# Patient Record
Sex: MALE | Race: WHITE | NOT HISPANIC OR LATINO | Employment: OTHER | URBAN - METROPOLITAN AREA
[De-identification: names, ages, dates, MRNs, and addresses within clinical notes are randomized per-mention and may not be internally consistent; named-entity substitution may affect disease eponyms.]

---

## 2020-04-25 ENCOUNTER — HOSPITAL ENCOUNTER (INPATIENT)
Facility: MEDICAL CENTER | Age: 70
LOS: 7 days | DRG: 190 | End: 2020-05-02
Attending: EMERGENCY MEDICINE | Admitting: HOSPITALIST
Payer: MEDICARE

## 2020-04-25 ENCOUNTER — APPOINTMENT (OUTPATIENT)
Dept: RADIOLOGY | Facility: MEDICAL CENTER | Age: 70
DRG: 190 | End: 2020-04-25
Attending: EMERGENCY MEDICINE
Payer: MEDICARE

## 2020-04-25 DIAGNOSIS — J44.9 CHRONIC OBSTRUCTIVE PULMONARY DISEASE, UNSPECIFIED COPD TYPE (HCC): ICD-10-CM

## 2020-04-25 DIAGNOSIS — J44.1 ACUTE EXACERBATION OF CHRONIC OBSTRUCTIVE PULMONARY DISEASE (COPD) (HCC): ICD-10-CM

## 2020-04-25 DIAGNOSIS — C22.9 MALIGNANT NEOPLASM OF LIVER, UNSPECIFIED LIVER MALIGNANCY TYPE (HCC): ICD-10-CM

## 2020-04-25 DIAGNOSIS — J18.9 PNEUMONIA OF LEFT LUNG DUE TO INFECTIOUS ORGANISM, UNSPECIFIED PART OF LUNG: ICD-10-CM

## 2020-04-25 PROBLEM — R74.01 TRANSAMINITIS: Status: ACTIVE | Noted: 2020-04-25

## 2020-04-25 PROBLEM — R65.10 SIRS (SYSTEMIC INFLAMMATORY RESPONSE SYNDROME) (HCC): Status: ACTIVE | Noted: 2020-04-25

## 2020-04-25 PROBLEM — J96.21 ACUTE AND CHRONIC RESPIRATORY FAILURE WITH HYPOXIA (HCC): Status: ACTIVE | Noted: 2020-04-25

## 2020-04-25 PROBLEM — R79.89 ELEVATED TROPONIN: Status: ACTIVE | Noted: 2020-04-25

## 2020-04-25 PROBLEM — E87.20 LACTIC ACID INCREASED: Status: ACTIVE | Noted: 2020-04-25

## 2020-04-25 LAB
ALBUMIN SERPL BCP-MCNC: 2.9 G/DL (ref 3.2–4.9)
ALBUMIN/GLOB SERPL: 0.9 G/DL
ALP SERPL-CCNC: 186 U/L (ref 30–99)
ALT SERPL-CCNC: 56 U/L (ref 2–50)
ANION GAP SERPL CALC-SCNC: 14 MMOL/L (ref 7–16)
ANISOCYTOSIS BLD QL SMEAR: ABNORMAL
APTT PPP: 35.5 SEC (ref 24.7–36)
AST SERPL-CCNC: 86 U/L (ref 12–45)
BASOPHILS # BLD AUTO: 0.5 % (ref 0–1.8)
BASOPHILS # BLD: 0.03 K/UL (ref 0–0.12)
BILIRUB SERPL-MCNC: 2.3 MG/DL (ref 0.1–1.5)
BUN SERPL-MCNC: 20 MG/DL (ref 8–22)
CALCIUM SERPL-MCNC: 8.2 MG/DL (ref 8.5–10.5)
CHLORIDE SERPL-SCNC: 104 MMOL/L (ref 96–112)
CO2 SERPL-SCNC: 17 MMOL/L (ref 20–33)
COMMENT 1642: NORMAL
COVID ORDER STATUS COVID19: NORMAL
CREAT SERPL-MCNC: 0.97 MG/DL (ref 0.5–1.4)
CRP SERPL HS-MCNC: 2.35 MG/DL (ref 0–0.75)
EKG IMPRESSION: NORMAL
EOSINOPHIL # BLD AUTO: 0.05 K/UL (ref 0–0.51)
EOSINOPHIL NFR BLD: 0.8 % (ref 0–6.9)
ERYTHROCYTE [DISTWIDTH] IN BLOOD BY AUTOMATED COUNT: 71.5 FL (ref 35.9–50)
FERRITIN SERPL-MCNC: 298 NG/ML (ref 22–322)
GLOBULIN SER CALC-MCNC: 3.1 G/DL (ref 1.9–3.5)
GLUCOSE SERPL-MCNC: 149 MG/DL (ref 65–99)
HCT VFR BLD AUTO: 40.3 % (ref 42–52)
HGB BLD-MCNC: 13.7 G/DL (ref 14–18)
IMM GRANULOCYTES # BLD AUTO: 0.03 K/UL (ref 0–0.11)
IMM GRANULOCYTES NFR BLD AUTO: 0.5 % (ref 0–0.9)
INR PPP: 1.41 (ref 0.87–1.13)
LACTATE BLD-SCNC: 4 MMOL/L (ref 0.5–2)
LYMPHOCYTES # BLD AUTO: 0.84 K/UL (ref 1–4.8)
LYMPHOCYTES NFR BLD: 13.5 % (ref 22–41)
MACROCYTES BLD QL SMEAR: ABNORMAL
MCH RBC QN AUTO: 37.8 PG (ref 27–33)
MCHC RBC AUTO-ENTMCNC: 34 G/DL (ref 33.7–35.3)
MCV RBC AUTO: 111.3 FL (ref 81.4–97.8)
MONOCYTES # BLD AUTO: 0.46 K/UL (ref 0–0.85)
MONOCYTES NFR BLD AUTO: 7.4 % (ref 0–13.4)
MORPHOLOGY BLD-IMP: NORMAL
NEUTROPHILS # BLD AUTO: 4.83 K/UL (ref 1.82–7.42)
NEUTROPHILS NFR BLD: 77.3 % (ref 44–72)
NRBC # BLD AUTO: 0 K/UL
NRBC BLD-RTO: 0 /100 WBC
PLATELET # BLD AUTO: 55 K/UL (ref 164–446)
PLATELET BLD QL SMEAR: NORMAL
PMV BLD AUTO: 11 FL (ref 9–12.9)
POTASSIUM SERPL-SCNC: 4.2 MMOL/L (ref 3.6–5.5)
PROCALCITONIN SERPL-MCNC: 0.22 NG/ML
PROT SERPL-MCNC: 6 G/DL (ref 6–8.2)
PROTHROMBIN TIME: 17.6 SEC (ref 12–14.6)
RBC # BLD AUTO: 3.62 M/UL (ref 4.7–6.1)
RBC BLD AUTO: PRESENT
SARS-COV-2 RNA RESP QL NAA+PROBE: NEGATIVE
SODIUM SERPL-SCNC: 135 MMOL/L (ref 135–145)
SPECIMEN SOURCE: NORMAL
TROPONIN T SERPL-MCNC: 35 NG/L (ref 6–19)
WBC # BLD AUTO: 6.2 K/UL (ref 4.8–10.8)

## 2020-04-25 PROCEDURE — 85730 THROMBOPLASTIN TIME PARTIAL: CPT

## 2020-04-25 PROCEDURE — G2023 SPECIMEN COLLECT COVID-19: HCPCS | Performed by: EMERGENCY MEDICINE

## 2020-04-25 PROCEDURE — 87522 HEPATITIS C REVRS TRNSCRPJ: CPT

## 2020-04-25 PROCEDURE — 71045 X-RAY EXAM CHEST 1 VIEW: CPT

## 2020-04-25 PROCEDURE — 94640 AIRWAY INHALATION TREATMENT: CPT

## 2020-04-25 PROCEDURE — 99285 EMERGENCY DEPT VISIT HI MDM: CPT

## 2020-04-25 PROCEDURE — 700111 HCHG RX REV CODE 636 W/ 250 OVERRIDE (IP): Performed by: HOSPITALIST

## 2020-04-25 PROCEDURE — 93005 ELECTROCARDIOGRAM TRACING: CPT

## 2020-04-25 PROCEDURE — 87040 BLOOD CULTURE FOR BACTERIA: CPT

## 2020-04-25 PROCEDURE — U0004 COV-19 TEST NON-CDC HGH THRU: HCPCS

## 2020-04-25 PROCEDURE — 85610 PROTHROMBIN TIME: CPT

## 2020-04-25 PROCEDURE — 80074 ACUTE HEPATITIS PANEL: CPT

## 2020-04-25 PROCEDURE — 80053 COMPREHEN METABOLIC PANEL: CPT

## 2020-04-25 PROCEDURE — 700102 HCHG RX REV CODE 250 W/ 637 OVERRIDE(OP): Performed by: HOSPITALIST

## 2020-04-25 PROCEDURE — 770020 HCHG ROOM/CARE - TELE (206)

## 2020-04-25 PROCEDURE — 86140 C-REACTIVE PROTEIN: CPT

## 2020-04-25 PROCEDURE — 700101 HCHG RX REV CODE 250: Performed by: HOSPITALIST

## 2020-04-25 PROCEDURE — 700105 HCHG RX REV CODE 258: Performed by: HOSPITALIST

## 2020-04-25 PROCEDURE — 85025 COMPLETE CBC W/AUTO DIFF WBC: CPT

## 2020-04-25 PROCEDURE — 83605 ASSAY OF LACTIC ACID: CPT

## 2020-04-25 PROCEDURE — 96365 THER/PROPH/DIAG IV INF INIT: CPT

## 2020-04-25 PROCEDURE — 99223 1ST HOSP IP/OBS HIGH 75: CPT | Performed by: HOSPITALIST

## 2020-04-25 PROCEDURE — 84145 PROCALCITONIN (PCT): CPT

## 2020-04-25 PROCEDURE — 700102 HCHG RX REV CODE 250 W/ 637 OVERRIDE(OP): Performed by: EMERGENCY MEDICINE

## 2020-04-25 PROCEDURE — 700105 HCHG RX REV CODE 258: Performed by: EMERGENCY MEDICINE

## 2020-04-25 PROCEDURE — 96375 TX/PRO/DX INJ NEW DRUG ADDON: CPT

## 2020-04-25 PROCEDURE — 700111 HCHG RX REV CODE 636 W/ 250 OVERRIDE (IP): Performed by: EMERGENCY MEDICINE

## 2020-04-25 PROCEDURE — 36415 COLL VENOUS BLD VENIPUNCTURE: CPT

## 2020-04-25 PROCEDURE — A9270 NON-COVERED ITEM OR SERVICE: HCPCS | Performed by: HOSPITALIST

## 2020-04-25 PROCEDURE — 82728 ASSAY OF FERRITIN: CPT

## 2020-04-25 PROCEDURE — A9270 NON-COVERED ITEM OR SERVICE: HCPCS | Performed by: EMERGENCY MEDICINE

## 2020-04-25 PROCEDURE — 84484 ASSAY OF TROPONIN QUANT: CPT

## 2020-04-25 RX ORDER — TRAZODONE HYDROCHLORIDE 50 MG/1
50 TABLET ORAL NIGHTLY
COMMUNITY

## 2020-04-25 RX ORDER — AMOXICILLIN 250 MG
2 CAPSULE ORAL 2 TIMES DAILY
Status: DISCONTINUED | OUTPATIENT
Start: 2020-04-25 | End: 2020-05-02 | Stop reason: HOSPADM

## 2020-04-25 RX ORDER — SILDENAFIL 25 MG/1
60 TABLET, FILM COATED ORAL 3 TIMES DAILY
COMMUNITY

## 2020-04-25 RX ORDER — ALBUTEROL SULFATE 90 UG/1
2 AEROSOL, METERED RESPIRATORY (INHALATION) ONCE
Status: COMPLETED | OUTPATIENT
Start: 2020-04-25 | End: 2020-04-25

## 2020-04-25 RX ORDER — DOXYCYCLINE 100 MG/1
100 TABLET ORAL EVERY 12 HOURS
Status: COMPLETED | OUTPATIENT
Start: 2020-04-26 | End: 2020-04-30

## 2020-04-25 RX ORDER — GUAIFENESIN/DEXTROMETHORPHAN 100-10MG/5
10 SYRUP ORAL EVERY 6 HOURS PRN
Status: DISCONTINUED | OUTPATIENT
Start: 2020-04-25 | End: 2020-05-02 | Stop reason: HOSPADM

## 2020-04-25 RX ORDER — MULTIVIT WITH MINERALS/LUTEIN
1 TABLET ORAL DAILY
COMMUNITY

## 2020-04-25 RX ORDER — SODIUM CHLORIDE, SODIUM LACTATE, POTASSIUM CHLORIDE, CALCIUM CHLORIDE 600; 310; 30; 20 MG/100ML; MG/100ML; MG/100ML; MG/100ML
1000 INJECTION, SOLUTION INTRAVENOUS ONCE
Status: COMPLETED | OUTPATIENT
Start: 2020-04-25 | End: 2020-04-25

## 2020-04-25 RX ORDER — AZITHROMYCIN 500 MG/1
500 INJECTION, POWDER, LYOPHILIZED, FOR SOLUTION INTRAVENOUS ONCE
Status: COMPLETED | OUTPATIENT
Start: 2020-04-25 | End: 2020-04-25

## 2020-04-25 RX ORDER — BISACODYL 10 MG
10 SUPPOSITORY, RECTAL RECTAL
Status: DISCONTINUED | OUTPATIENT
Start: 2020-04-25 | End: 2020-05-02 | Stop reason: HOSPADM

## 2020-04-25 RX ORDER — ACETAMINOPHEN 325 MG/1
650 TABLET ORAL EVERY 6 HOURS PRN
Status: DISCONTINUED | OUTPATIENT
Start: 2020-04-25 | End: 2020-05-02 | Stop reason: HOSPADM

## 2020-04-25 RX ORDER — ECHINACEA PURPUREA EXTRACT 125 MG
2 TABLET ORAL 2 TIMES DAILY PRN
COMMUNITY

## 2020-04-25 RX ORDER — AZITHROMYCIN 250 MG/1
500 TABLET, FILM COATED ORAL DAILY
Status: DISCONTINUED | OUTPATIENT
Start: 2020-04-25 | End: 2020-04-25

## 2020-04-25 RX ORDER — METHYLPREDNISOLONE SODIUM SUCCINATE 125 MG/2ML
125 INJECTION, POWDER, LYOPHILIZED, FOR SOLUTION INTRAMUSCULAR; INTRAVENOUS ONCE
Status: COMPLETED | OUTPATIENT
Start: 2020-04-25 | End: 2020-04-25

## 2020-04-25 RX ORDER — IPRATROPIUM BROMIDE AND ALBUTEROL SULFATE 2.5; .5 MG/3ML; MG/3ML
3 SOLUTION RESPIRATORY (INHALATION)
Status: DISCONTINUED | OUTPATIENT
Start: 2020-04-25 | End: 2020-04-26 | Stop reason: ALTCHOICE

## 2020-04-25 RX ORDER — MACITENTAN 10 MG/1
1 TABLET, FILM COATED ORAL DAILY
COMMUNITY

## 2020-04-25 RX ORDER — TERAZOSIN 2 MG/1
2 CAPSULE ORAL DAILY
COMMUNITY

## 2020-04-25 RX ORDER — TRAMADOL HYDROCHLORIDE 50 MG/1
50 TABLET ORAL EVERY 6 HOURS PRN
Status: DISCONTINUED | OUTPATIENT
Start: 2020-04-25 | End: 2020-04-26

## 2020-04-25 RX ORDER — POLYETHYLENE GLYCOL 3350 17 G/17G
1 POWDER, FOR SOLUTION ORAL
Status: DISCONTINUED | OUTPATIENT
Start: 2020-04-25 | End: 2020-05-02 | Stop reason: HOSPADM

## 2020-04-25 RX ADMIN — IPRATROPIUM BROMIDE AND ALBUTEROL SULFATE 3 ML: .5; 3 SOLUTION RESPIRATORY (INHALATION) at 23:06

## 2020-04-25 RX ADMIN — AMPICILLIN SODIUM AND SULBACTAM SODIUM 3 G: 2; 1 INJECTION, POWDER, FOR SOLUTION INTRAMUSCULAR; INTRAVENOUS at 21:55

## 2020-04-25 RX ADMIN — AZITHROMYCIN DIHYDRATE 500 MG: 500 INJECTION, POWDER, LYOPHILIZED, FOR SOLUTION INTRAVENOUS at 21:54

## 2020-04-25 RX ADMIN — TRAMADOL HYDROCHLORIDE 50 MG: 50 TABLET, FILM COATED ORAL at 23:58

## 2020-04-25 RX ADMIN — METHYLPREDNISOLONE SODIUM SUCCINATE 125 MG: 125 INJECTION, POWDER, FOR SOLUTION INTRAMUSCULAR; INTRAVENOUS at 20:56

## 2020-04-25 RX ADMIN — GUAIFENESIN AND DEXTROMETHORPHAN 10 ML: 100; 10 SYRUP ORAL at 23:58

## 2020-04-25 RX ADMIN — SODIUM CHLORIDE, POTASSIUM CHLORIDE, SODIUM LACTATE AND CALCIUM CHLORIDE 1000 ML: 600; 310; 30; 20 INJECTION, SOLUTION INTRAVENOUS at 22:35

## 2020-04-25 RX ADMIN — SODIUM CHLORIDE, POTASSIUM CHLORIDE, SODIUM LACTATE AND CALCIUM CHLORIDE 1000 ML: 600; 310; 30; 20 INJECTION, SOLUTION INTRAVENOUS at 21:06

## 2020-04-25 RX ADMIN — ALBUTEROL SULFATE 2 PUFF: 90 AEROSOL, METERED RESPIRATORY (INHALATION) at 21:02

## 2020-04-25 ASSESSMENT — ENCOUNTER SYMPTOMS
PALPITATIONS: 0
CHILLS: 0
PHOTOPHOBIA: 0
BACK PAIN: 1
MYALGIAS: 1
COUGH: 1
FEVER: 1
DIARRHEA: 0
FOCAL WEAKNESS: 0
DEPRESSION: 0
VOMITING: 1
NAUSEA: 1
ABDOMINAL PAIN: 0
WHEEZING: 1
HEADACHES: 0
DIZZINESS: 0
SORE THROAT: 0
TINGLING: 0
SHORTNESS OF BREATH: 1

## 2020-04-25 ASSESSMENT — PATIENT HEALTH QUESTIONNAIRE - PHQ9
1. LITTLE INTEREST OR PLEASURE IN DOING THINGS: NOT AT ALL
2. FEELING DOWN, DEPRESSED, IRRITABLE, OR HOPELESS: NOT AT ALL
SUM OF ALL RESPONSES TO PHQ9 QUESTIONS 1 AND 2: 0

## 2020-04-25 ASSESSMENT — FIBROSIS 4 INDEX: FIB4 SCORE: 14

## 2020-04-25 ASSESSMENT — PAIN SCALES - WONG BAKER: WONGBAKER_NUMERICALRESPONSE: HURTS EVEN MORE

## 2020-04-26 PROBLEM — C22.9 LIVER CANCER (HCC): Status: ACTIVE | Noted: 2020-04-26

## 2020-04-26 PROBLEM — J44.9 COPD (CHRONIC OBSTRUCTIVE PULMONARY DISEASE) (HCC): Status: ACTIVE | Noted: 2020-04-26

## 2020-04-26 PROBLEM — D69.6 THROMBOCYTOPENIA (HCC): Status: ACTIVE | Noted: 2020-04-26

## 2020-04-26 PROBLEM — I27.20 PULMONARY HYPERTENSION (HCC): Status: ACTIVE | Noted: 2020-04-26

## 2020-04-26 LAB
ALBUMIN SERPL BCP-MCNC: 2.6 G/DL (ref 3.2–4.9)
ALBUMIN/GLOB SERPL: 0.9 G/DL
ALP SERPL-CCNC: 164 U/L (ref 30–99)
ALT SERPL-CCNC: 49 U/L (ref 2–50)
ANION GAP SERPL CALC-SCNC: 15 MMOL/L (ref 7–16)
AST SERPL-CCNC: 78 U/L (ref 12–45)
BILIRUB SERPL-MCNC: 2.3 MG/DL (ref 0.1–1.5)
BUN SERPL-MCNC: 18 MG/DL (ref 8–22)
CALCIUM SERPL-MCNC: 7.8 MG/DL (ref 8.5–10.5)
CHLORIDE SERPL-SCNC: 106 MMOL/L (ref 96–112)
CO2 SERPL-SCNC: 16 MMOL/L (ref 20–33)
CREAT SERPL-MCNC: 0.77 MG/DL (ref 0.5–1.4)
ERYTHROCYTE [DISTWIDTH] IN BLOOD BY AUTOMATED COUNT: 69.7 FL (ref 35.9–50)
GLOBULIN SER CALC-MCNC: 2.8 G/DL (ref 1.9–3.5)
GLUCOSE SERPL-MCNC: 163 MG/DL (ref 65–99)
HAV IGM SERPL QL IA: ABNORMAL
HBV CORE IGM SER QL: ABNORMAL
HBV SURFACE AG SER QL: ABNORMAL
HCT VFR BLD AUTO: 35.8 % (ref 42–52)
HCV AB SER QL: REACTIVE
HGB BLD-MCNC: 12.5 G/DL (ref 14–18)
LACTATE BLD-SCNC: 1 MMOL/L (ref 0.5–2)
LACTATE BLD-SCNC: 3 MMOL/L (ref 0.5–2)
LACTATE BLD-SCNC: 3.1 MMOL/L (ref 0.5–2)
LACTATE BLD-SCNC: 4.8 MMOL/L (ref 0.5–2)
MCH RBC QN AUTO: 38.2 PG (ref 27–33)
MCHC RBC AUTO-ENTMCNC: 34.9 G/DL (ref 33.7–35.3)
MCV RBC AUTO: 109.5 FL (ref 81.4–97.8)
MORPHOLOGY BLD-IMP: NORMAL
PLATELET # BLD AUTO: 47 K/UL (ref 164–446)
PLATELET BLD QL SMEAR: NORMAL
PLATELETS.RETICULATED NFR BLD AUTO: 3.9 K/UL (ref 0.6–13.1)
PMV BLD AUTO: 11.7 FL (ref 9–12.9)
POTASSIUM SERPL-SCNC: 3.7 MMOL/L (ref 3.6–5.5)
PROT SERPL-MCNC: 5.4 G/DL (ref 6–8.2)
RBC # BLD AUTO: 3.27 M/UL (ref 4.7–6.1)
SODIUM SERPL-SCNC: 137 MMOL/L (ref 135–145)
TROPONIN T SERPL-MCNC: 26 NG/L (ref 6–19)
TROPONIN T SERPL-MCNC: 26 NG/L (ref 6–19)
WBC # BLD AUTO: 4.5 K/UL (ref 4.8–10.8)

## 2020-04-26 PROCEDURE — 700105 HCHG RX REV CODE 258: Performed by: HOSPITALIST

## 2020-04-26 PROCEDURE — 84484 ASSAY OF TROPONIN QUANT: CPT

## 2020-04-26 PROCEDURE — 94640 AIRWAY INHALATION TREATMENT: CPT

## 2020-04-26 PROCEDURE — 770020 HCHG ROOM/CARE - TELE (206)

## 2020-04-26 PROCEDURE — 700111 HCHG RX REV CODE 636 W/ 250 OVERRIDE (IP): Performed by: HOSPITALIST

## 2020-04-26 PROCEDURE — 85055 RETICULATED PLATELET ASSAY: CPT

## 2020-04-26 PROCEDURE — 94669 MECHANICAL CHEST WALL OSCILL: CPT

## 2020-04-26 PROCEDURE — 85027 COMPLETE CBC AUTOMATED: CPT

## 2020-04-26 PROCEDURE — 94760 N-INVAS EAR/PLS OXIMETRY 1: CPT

## 2020-04-26 PROCEDURE — 99232 SBSQ HOSP IP/OBS MODERATE 35: CPT | Performed by: HOSPITALIST

## 2020-04-26 PROCEDURE — A9270 NON-COVERED ITEM OR SERVICE: HCPCS | Performed by: HOSPITALIST

## 2020-04-26 PROCEDURE — 700102 HCHG RX REV CODE 250 W/ 637 OVERRIDE(OP): Performed by: HOSPITALIST

## 2020-04-26 PROCEDURE — 80053 COMPREHEN METABOLIC PANEL: CPT

## 2020-04-26 PROCEDURE — 83605 ASSAY OF LACTIC ACID: CPT | Mod: 91

## 2020-04-26 PROCEDURE — 700101 HCHG RX REV CODE 250: Performed by: HOSPITALIST

## 2020-04-26 RX ORDER — OXYCODONE HYDROCHLORIDE 5 MG/1
5 TABLET ORAL EVERY 4 HOURS PRN
Status: DISCONTINUED | OUTPATIENT
Start: 2020-04-26 | End: 2020-05-02 | Stop reason: HOSPADM

## 2020-04-26 RX ORDER — ALBUTEROL SULFATE 90 UG/1
2 AEROSOL, METERED RESPIRATORY (INHALATION)
Status: DISCONTINUED | OUTPATIENT
Start: 2020-04-26 | End: 2020-04-29

## 2020-04-26 RX ORDER — SODIUM CHLORIDE 9 MG/ML
INJECTION, SOLUTION INTRAVENOUS CONTINUOUS
Status: DISCONTINUED | OUTPATIENT
Start: 2020-04-26 | End: 2020-04-27

## 2020-04-26 RX ORDER — TRAZODONE HYDROCHLORIDE 50 MG/1
50 TABLET ORAL NIGHTLY
Status: DISCONTINUED | OUTPATIENT
Start: 2020-04-26 | End: 2020-05-02 | Stop reason: HOSPADM

## 2020-04-26 RX ORDER — OXYCODONE HYDROCHLORIDE 5 MG/1
2.5 TABLET ORAL EVERY 8 HOURS PRN
Qty: 8 TAB | Refills: 0 | Status: SHIPPED | OUTPATIENT
Start: 2020-04-26 | End: 2020-05-01

## 2020-04-26 RX ADMIN — ALBUTEROL SULFATE 2 PUFF: 90 AEROSOL, METERED RESPIRATORY (INHALATION) at 08:00

## 2020-04-26 RX ADMIN — GUAIFENESIN AND DEXTROMETHORPHAN 10 ML: 100; 10 SYRUP ORAL at 20:15

## 2020-04-26 RX ADMIN — ALBUTEROL SULFATE 2 PUFF: 90 AEROSOL, METERED RESPIRATORY (INHALATION) at 15:00

## 2020-04-26 RX ADMIN — AMPICILLIN SODIUM AND SULBACTAM SODIUM 3 G: 2; 1 INJECTION, POWDER, FOR SOLUTION INTRAMUSCULAR; INTRAVENOUS at 13:15

## 2020-04-26 RX ADMIN — OXYCODONE HYDROCHLORIDE 5 MG: 5 TABLET ORAL at 21:48

## 2020-04-26 RX ADMIN — DOXYCYCLINE 100 MG: 100 TABLET, FILM COATED ORAL at 17:36

## 2020-04-26 RX ADMIN — OXYCODONE HYDROCHLORIDE 5 MG: 5 TABLET ORAL at 10:17

## 2020-04-26 RX ADMIN — ALBUTEROL SULFATE 2 PUFF: 90 AEROSOL, METERED RESPIRATORY (INHALATION) at 11:00

## 2020-04-26 RX ADMIN — ACETAMINOPHEN 650 MG: 325 TABLET, FILM COATED ORAL at 20:15

## 2020-04-26 RX ADMIN — DOXYCYCLINE 100 MG: 100 TABLET, FILM COATED ORAL at 05:40

## 2020-04-26 RX ADMIN — ASPIRIN 81 MG: 81 TABLET, COATED ORAL at 17:36

## 2020-04-26 RX ADMIN — TRAZODONE HYDROCHLORIDE 50 MG: 50 TABLET ORAL at 21:48

## 2020-04-26 RX ADMIN — OXYCODONE HYDROCHLORIDE 5 MG: 5 TABLET ORAL at 17:36

## 2020-04-26 RX ADMIN — AMPICILLIN SODIUM AND SULBACTAM SODIUM 3 G: 2; 1 INJECTION, POWDER, FOR SOLUTION INTRAMUSCULAR; INTRAVENOUS at 05:40

## 2020-04-26 RX ADMIN — IPRATROPIUM BROMIDE AND ALBUTEROL SULFATE 3 ML: .5; 3 SOLUTION RESPIRATORY (INHALATION) at 03:30

## 2020-04-26 RX ADMIN — GUAIFENESIN AND DEXTROMETHORPHAN 10 ML: 100; 10 SYRUP ORAL at 05:40

## 2020-04-26 RX ADMIN — AMPICILLIN SODIUM AND SULBACTAM SODIUM 3 G: 2; 1 INJECTION, POWDER, FOR SOLUTION INTRAMUSCULAR; INTRAVENOUS at 23:59

## 2020-04-26 RX ADMIN — TRAMADOL HYDROCHLORIDE 50 MG: 50 TABLET, FILM COATED ORAL at 05:40

## 2020-04-26 RX ADMIN — SODIUM CHLORIDE: 9 INJECTION, SOLUTION INTRAVENOUS at 10:34

## 2020-04-26 RX ADMIN — ALBUTEROL SULFATE 2 PUFF: 90 AEROSOL, METERED RESPIRATORY (INHALATION) at 20:17

## 2020-04-26 RX ADMIN — AMPICILLIN SODIUM AND SULBACTAM SODIUM 3 G: 2; 1 INJECTION, POWDER, FOR SOLUTION INTRAMUSCULAR; INTRAVENOUS at 17:49

## 2020-04-26 ASSESSMENT — PAIN SCALES - WONG BAKER
WONGBAKER_NUMERICALRESPONSE: DOESN'T HURT AT ALL
WONGBAKER_NUMERICALRESPONSE: HURTS A LITTLE MORE
WONGBAKER_NUMERICALRESPONSE: HURTS EVEN MORE
WONGBAKER_NUMERICALRESPONSE: HURTS A WHOLE LOT

## 2020-04-26 ASSESSMENT — LIFESTYLE VARIABLES
HAVE PEOPLE ANNOYED YOU BY CRITICIZING YOUR DRINKING: NO
TOTAL SCORE: 0
AVERAGE NUMBER OF DAYS PER WEEK YOU HAVE A DRINK CONTAINING ALCOHOL: 0
ON A TYPICAL DAY WHEN YOU DRINK ALCOHOL HOW MANY DRINKS DO YOU HAVE: 0
TOTAL SCORE: 0
EVER_SMOKED: NEVER
DOES PATIENT WANT TO STOP DRINKING: NO
HOW MANY TIMES IN THE PAST YEAR HAVE YOU HAD 5 OR MORE DRINKS IN A DAY: 0
ALCOHOL_USE: NO
TOTAL SCORE: 0
CONSUMPTION TOTAL: NEGATIVE
HAVE YOU EVER FELT YOU SHOULD CUT DOWN ON YOUR DRINKING: NO
EVER HAD A DRINK FIRST THING IN THE MORNING TO STEADY YOUR NERVES TO GET RID OF A HANGOVER: NO
EVER FELT BAD OR GUILTY ABOUT YOUR DRINKING: NO

## 2020-04-26 ASSESSMENT — ENCOUNTER SYMPTOMS
DIZZINESS: 0
FALLS: 0
PSYCHIATRIC NEGATIVE: 1
LOSS OF CONSCIOUSNESS: 0
ABDOMINAL PAIN: 0
SPEECH CHANGE: 0
FEVER: 1
HEADACHES: 0
SHORTNESS OF BREATH: 1
SPUTUM PRODUCTION: 0
NAUSEA: 1
MYALGIAS: 1
PALPITATIONS: 0
VOMITING: 0
CHILLS: 0
WEAKNESS: 1
COUGH: 1

## 2020-04-26 ASSESSMENT — PATIENT HEALTH QUESTIONNAIRE - PHQ9
1. LITTLE INTEREST OR PLEASURE IN DOING THINGS: NOT AT ALL
SUM OF ALL RESPONSES TO PHQ9 QUESTIONS 1 AND 2: 0
2. FEELING DOWN, DEPRESSED, IRRITABLE, OR HOPELESS: NOT AT ALL

## 2020-04-26 NOTE — PROGRESS NOTES
[facility] Medicine Daily Progress Note    Date of Service  4/26/2020    Chief Complaint  69 y.o. male admitted 4/25/2020 with flu like symptoms and SOB.     Interval Problem Update  Asks about his home medications. Eager to get home but still requiring 5L NC. Has been self isolating at home and has no travel or sick contacts.     Consultants/Specialty  None    Code Status  Full    Disposition  Anticipate home tomorrow.    Review of Systems  Review of Systems   Constitutional: Positive for fever and malaise/fatigue. Negative for chills.   Respiratory: Positive for cough and shortness of breath. Negative for sputum production.    Cardiovascular: Negative for chest pain, palpitations and leg swelling.   Gastrointestinal: Positive for nausea. Negative for abdominal pain and vomiting.   Genitourinary: Negative for dysuria.   Musculoskeletal: Positive for myalgias. Negative for falls.   Neurological: Positive for weakness. Negative for dizziness, speech change, loss of consciousness and headaches.   Psychiatric/Behavioral: Negative.    All other systems reviewed and are negative.       Physical Exam  Temp:  [37.1 °C (98.8 °F)-37.3 °C (99.2 °F)] 37.3 °C (99.2 °F)  Pulse:  [78-95] 82  Resp:  [16-28] 19  BP: ()/(55-87) 106/66  SpO2:  [92 %-95 %] 94 %    Physical Exam  Vitals signs reviewed.   Constitutional:       General: He is not in acute distress.     Appearance: He is not diaphoretic.   HENT:      Head: Normocephalic.      Mouth/Throat:      Mouth: Mucous membranes are dry.   Eyes:      General:         Right eye: No discharge.         Left eye: No discharge.      Extraocular Movements: Extraocular movements intact.   Neck:      Musculoskeletal: Normal range of motion. No muscular tenderness.   Cardiovascular:      Rate and Rhythm: Normal rate and regular rhythm.      Pulses: Normal pulses.   Pulmonary:      Effort: Pulmonary effort is normal. No respiratory distress.      Breath sounds: No wheezing or rales.    Abdominal:      Palpations: Abdomen is soft.      Tenderness: There is no abdominal tenderness. There is no guarding.   Musculoskeletal:         General: No tenderness or deformity.   Skin:     General: Skin is dry.   Neurological:      General: No focal deficit present.      Mental Status: He is alert and oriented to person, place, and time.   Psychiatric:         Mood and Affect: Mood normal.         Speech: Speech normal.         Behavior: Behavior normal.         Fluids    Intake/Output Summary (Last 24 hours) at 4/26/2020 0727  Last data filed at 4/26/2020 0433  Gross per 24 hour   Intake --   Output 500 ml   Net -500 ml       Laboratory  Recent Labs     04/25/20 2040 04/26/20  0010   WBC 6.2 4.5*   RBC 3.62* 3.27*   HEMOGLOBIN 13.7* 12.5*   HEMATOCRIT 40.3* 35.8*   .3* 109.5*   MCH 37.8* 38.2*   MCHC 34.0 34.9   RDW 71.5* 69.7*   PLATELETCT 55* 47*   MPV 11.0 11.7     Recent Labs     04/25/20 2040 04/26/20  0010   SODIUM 135 137   POTASSIUM 4.2 3.7   CHLORIDE 104 106   CO2 17* 16*   GLUCOSE 149* 163*   BUN 20 18   CREATININE 0.97 0.77   CALCIUM 8.2* 7.8*     Recent Labs     04/25/20 2040   APTT 35.5   INR 1.41*               Imaging  DX-CHEST-PORTABLE (1 VIEW)   Final Result      1.  Central vascular and mild interstitial prominence which could represent vascular congestion. Cannot exclude interstitial pneumonitis.   2.  Possible retrocardiac left lower lobe opacity which may represent atelectasis and/or pneumonitis.   3.  CT chest may be performed for further evaluation.           Assessment/Plan  * Acute and chronic respiratory failure with hypoxia (HCC)- (present on admission)  Assessment & Plan  Secondary to COPD exacerbation, CAP? COVID negative. Chest x-ray shows possible pneumonitis versus pneumonia.  - weaned from 15L face mask to 5L  - s/p 2L IVF in the ED  - RT protocol  - albuterol PRN  - continue with doxy and unasyn    COPD (chronic obstructive pulmonary disease) (HCC)- (present on  admission)  Assessment & Plan  Denies wheezing or recent exacerbations.   - RT protocol  - albuterol PRN    Thrombocytopenia (HCC)- (present on admission)  Assessment & Plan  History of liver cancer, unknown baseline  -  Hold anticoagulation.    Elevated troponin  Assessment & Plan  Resolved. No chest pain, likely ischemic demand in the setting of hypoxia.   - continue tele  - monitor clinically    Transaminitis- (present on admission)  Assessment & Plan  Mildly elevated on admission and improved today. In the setting of liver cancer.  - continue home oral chemo    Lactic acid increased- (present on admission)  Assessment & Plan  Secondary to dehydration, suspected pneumonia, and hypoxia.   - covid negative  - continue abx  - holding diuretics and start gentle fluids    CAP (community acquired pneumonia)- (present on admission)  Assessment & Plan  Treatment as noted above.  - procal pending    SIRS (systemic inflammatory response syndrome) (HCC)- (present on admission)  Assessment & Plan  SIRS criteria identified on my evaluation include:  Tachypnea, with respirations greater than 20 per minute    Patient is tachypneic and has lactic acidosis, otherwise no leukocytosis, he is afebrile and blood pressure stable.  Treatment as noted above.       VTE prophylaxis: SCDs

## 2020-04-26 NOTE — ED TRIAGE NOTES
Walk in with family for fever, cough SOB, body aches, fever, chills, nausea, and vomiting x3 days.  Cough became productive 4-5 days ago. Currently complains of left rib pain 5/10 at rest, worsening with coughing. Alert and orientated,  SOB with rest, oxygen saturation 75% at time of walk in.  Immediately roomed and placed on non-rebreather with currently oxygen saturation at 93. IV placed and labs drawn. EKG done at bedside. Pt has hx of liver cancer.

## 2020-04-26 NOTE — ED NOTES
Tech from Lab called with critical result of lactic 4.0 at 2055. Critical lab result read back to tech.   Dr. Mcneil notified of critical lab result at 2055.  Critical lab result read back by Dr. Mcneil.

## 2020-04-26 NOTE — ASSESSMENT & PLAN NOTE
History of liver cancer, unknown baseline. Stable ~50. No evidence of active bleeding  Continue to monitor.  Hold anticoagulation.

## 2020-04-26 NOTE — PROGRESS NOTES
Transported  from green pod, aox4, sr on monitor, unsteady on his feet.  Call light within reach. Needs attended. Plan of care discussed and understood.

## 2020-04-26 NOTE — ASSESSMENT & PLAN NOTE
Secondary to dehydration, suspected pneumonia, and hypoxia.   - covid negative  - continue abx  - holding diuretics and start gentle fluids

## 2020-04-26 NOTE — CARE PLAN
Problem: Infection  Goal: Will remain free from infection  Outcome: PROGRESSING AS EXPECTED  Intervention: Implement standard precautions and perform hand washing before and after patient contact  Note: Practice  aseptic techniques.     Problem: Respiratory:  Goal: Respiratory status will improve  Outcome: PROGRESSING AS EXPECTED  Intervention: Administer and titrate oxygen therapy  Note: RT protocol in place.

## 2020-04-26 NOTE — CARE PLAN
Pt instructed on use of incentive spirometer and pt demonstrate use back correctly. Informed pt goal is to get him to 5L or less of oxygen in order for him to discharge. Pt shows understanding. Will continue to monitor.

## 2020-04-26 NOTE — PROGRESS NOTES
With complaints of sharp  left rib pain 4/10, enma when coughing. Medicated as scheduled. Call light within reach. To continue to monitor.

## 2020-04-26 NOTE — ED PROVIDER NOTES
ED Provider Note    Scribed for Dr. Jerome Mcneil M.D. by Jerome Jasmine. 4/25/2020  8:37 PM    Primary care provider: No primary care provider noted.  Means of arrival: walk-in  History obtained from: patient  History limited by: none    CHIEF COMPLAINT  Chief Complaint   Patient presents with   • Shortness of Breath     fever, cough, body aches, nausea, vomiting. low saturation at check in 75%       HPI  Virgilio Loza is a 69 y.o. male with a history of COPD who presents to the Emergency Department with complaints of a cough for the past month. He recently developed increased shortness of breath and fever last night. He further reports associated left back pain. No alleviating factors noted. Patient's family found it difficult to wake him up which why he was brought in. The patient was hypoxic in triage in the 70s. He is on nocturnal oxygen.     PPE Note: I personally donned full PPE for all patient encounters during this visit, including being clean-shaven with an N95 respirator mask, gloves, and goggles.     Scribe remained outside the patient's room and did not have any contact with the patient for the duration of patient encounter.      REVIEW OF SYSTEMS  Pertinent positives include cough, shortness of breath, fever, left back pain. As above, all other systems reviewed and are negative.   See HPI for further details.     PAST MEDICAL HISTORY  COPD    SURGICAL HISTORY  patient denies any surgical history    SOCIAL HISTORY   Unable to obtain due to acuity of condition.    FAMILY HISTORY  None noted when reviewed.     CURRENT MEDICATIONS  None noted when reviewed.      ALLERGIES  Not on File    PHYSICAL EXAM  VITAL SIGNS: BP (!) 162/76   Pulse 95   Resp (!) 28   Ht 1.829 m (6')   Wt 72.6 kg (160 lb)   SpO2 93%   BMI 21.70 kg/m²     Constitutional: Well developed, Well nourished, Moderate distress.  HENT: Normocephalic, Atraumatic, Bilateral external ears normal, Oropharynx moist, No oral exudates.   Eyes:  PERRLA, EOMI, Conjunctiva normal, No discharge.   Neck: No tenderness, Supple, No stridor.   Lymphatic: No lymphadenopathy noted.   Cardiovascular: Normal heart rate, Normal rhythm.   Thorax & Lungs: Pain with inspiration in the lower lung field on the left. Wheezes heard throughout. Moderate respiratory distress.   Abdomen: Soft, No tenderness, No masses, No pulsatile masses.   Skin: Warm, Dry, No erythema, No rash.   Extremities:, No edema No cyanosis.   Musculoskeletal: No tenderness to palpation or major deformities noted.  Intact distal pulses  Neurologic: Awake, alert. Moves all extremities spontaneously.  Psychiatric: Affect normal, Judgment normal, Mood normal.     LABS  Results for orders placed or performed during the hospital encounter of 04/25/20   CBC WITH DIFFERENTIAL   Result Value Ref Range    WBC 6.2 4.8 - 10.8 K/uL    RBC 3.62 (L) 4.70 - 6.10 M/uL    Hemoglobin 13.7 (L) 14.0 - 18.0 g/dL    Hematocrit 40.3 (L) 42.0 - 52.0 %    .3 (H) 81.4 - 97.8 fL    MCH 37.8 (H) 27.0 - 33.0 pg    MCHC 34.0 33.7 - 35.3 g/dL    RDW 71.5 (H) 35.9 - 50.0 fL    Platelet Count 55 (L) 164 - 446 K/uL    MPV 11.0 9.0 - 12.9 fL    Neutrophils-Polys 77.30 (H) 44.00 - 72.00 %    Lymphocytes 13.50 (L) 22.00 - 41.00 %    Monocytes 7.40 0.00 - 13.40 %    Eosinophils 0.80 0.00 - 6.90 %    Basophils 0.50 0.00 - 1.80 %    Immature Granulocytes 0.50 0.00 - 0.90 %    Nucleated RBC 0.00 /100 WBC    Neutrophils (Absolute) 4.83 1.82 - 7.42 K/uL    Lymphs (Absolute) 0.84 (L) 1.00 - 4.80 K/uL    Monos (Absolute) 0.46 0.00 - 0.85 K/uL    Eos (Absolute) 0.05 0.00 - 0.51 K/uL    Baso (Absolute) 0.03 0.00 - 0.12 K/uL    Immature Granulocytes (abs) 0.03 0.00 - 0.11 K/uL    NRBC (Absolute) 0.00 K/uL    Anisocytosis 2+     Macrocytosis 2+    COMP METABOLIC PANEL   Result Value Ref Range    Co2 17 (L) 20 - 33 mmol/L    Glucose 149 (H) 65 - 99 mg/dL    Bun 20 8 - 22 mg/dL    Creatinine 0.97 0.50 - 1.40 mg/dL    Calcium 8.2 (L) 8.5 - 10.5  mg/dL    AST(SGOT) 86 (H) 12 - 45 U/L    ALT(SGPT) 56 (H) 2 - 50 U/L    Alkaline Phosphatase 186 (H) 30 - 99 U/L    Total Bilirubin 2.3 (H) 0.1 - 1.5 mg/dL    Albumin 2.9 (L) 3.2 - 4.9 g/dL    Total Protein 6.0 6.0 - 8.2 g/dL    Globulin 3.1 1.9 - 3.5 g/dL    A-G Ratio 0.9 g/dL   TROPONIN   Result Value Ref Range    Troponin T 35 (H) 6 - 19 ng/L   LACTIC ACID   Result Value Ref Range    Lactic Acid 4.0 (HH) 0.5 - 2.0 mmol/L   PROTHROMBIN TIME (INR)   Result Value Ref Range    PT 17.6 (H) 12.0 - 14.6 sec    INR 1.41 (H) 0.87 - 1.13   APTT   Result Value Ref Range    APTT 35.5 24.7 - 36.0 sec   PERIPHERAL SMEAR REVIEW   Result Value Ref Range    Peripheral Smear Review see below    PLATELET ESTIMATE   Result Value Ref Range    Plt Estimation Decreased    MORPHOLOGY   Result Value Ref Range    RBC Morphology Present    DIFFERENTIAL COMMENT   Result Value Ref Range    Comments-Diff see below    ESTIMATED GFR   Result Value Ref Range    GFR If African American >60 >60 mL/min/1.73 m 2    GFR If Non African American >60 >60 mL/min/1.73 m 2   EKG   Result Value Ref Range    Report       [facility] Emergency Dept.    Test Date:  2020  Pt Name:    FLACO PANDEY               Department: ER  MRN:        [id number]                      Room:       [facility]  Gender:     Male                         Technician: [id number]  :        1950                   Requested By:WILL CAMARGO  Order #:    [id number]                    Reading MD: WILL CAMARGO MD    Measurements  Intervals                                Axis  Rate:       85                           P:          77  AL:         147                          QRS:        91  QRSD:       108                          T:          62  QT:         424  QTc:        505    Interpretive Statements  SINUS RHYTHM  ATRIAL PREMATURE COMPLEX  PROBABLE LEFT ATRIAL ABNORMALITY  INCOMPLETE RIGHT BUNDLE BRANCH BLOCK  BORDERLINE INFERIOR Q WAVES  PROLONGED QT  INTERVAL  ARTIFACT IN LEAD(S) I,aVR,aVL,aVF,V1,V3,V4,V5,V6  No previous ECG available for comparison  Electronically Signed On 4- 21:29:36 PDT by MARK CAMARGO MD        All labs reviewed by me.    EKG  Interpreted by me, as seen above.     RADIOLOGY  DX-CHEST-PORTABLE (1 VIEW)   Final Result      1.  Central vascular and mild interstitial prominence which could represent vascular congestion. Cannot exclude interstitial pneumonitis.   2.  Possible retrocardiac left lower lobe opacity which may represent atelectasis and/or pneumonitis.   3.  CT chest may be performed for further evaluation.        The radiologist's interpretation of all radiological studies have been reviewed by me.    COURSE & MEDICAL DECISION MAKING  Pertinent Labs & Imaging studies reviewed. (See chart for details)    8:37 PM - I was called to urgently evaluate the patient at bedside. Patient was started on a non-rebreather with improvement with his O2 saturations from the 70's to the low 90's. Patient will be treated with albuterol inhaler 2 puffs, and Solumedrol 125 mg IM. Ordered DX-chest, CBC w/ diff, CMP, troponin, lactic acid, blood cultures, INR, APTT, and COVID testing to evaluate his symptoms. The differential diagnoses include but are not limited to: COVID-19 respiratory infection, COPD exacerbation, pneumonia.     9:24 PM - Lactic acid returned as critically elevated at 4.0. Ordered LR infusion, Unasyn 3g, and Azithromycin 500 mg to treat. Britni hospitalist.     9:35 PM I discussed the patient's case and the above findings with Dr. Gomes (Hospitalist) who agrees to hospitalize the patient.      HYDRATION: Based on the patient's presentation of Sepsis the patient was given IV fluids. IV Hydration was used because oral hydration was not adequate alone. Upon recheck following hydration, the patient was stable.       Decision Making:  This is a patient who is presenting with acute shortness of breath and hypoxia.  He appears to  have pneumonia and is septic he certainly be possibly COVID positive.  He does have significant wheezing and there is some component of COPD exacerbation as well.  Patient be treated with fluid bolus antibiotics albuterol inhaler steroids  DISPOSITION:  Patient will be hospitalized by Dr. Gomes in guarded condition.     FINAL IMPRESSION  1. Acute exacerbation of chronic obstructive pulmonary disease (COPD) (HCC)    2. Pneumonia of left lung due to infectious organism, unspecified part of lung          IJerome (Scribe), am scribing for, and in the presence of, Jerome Mcneil M.D..    Electronically signed by: Jerome Jasmine (Scribe), 4/25/2020    IJerome M.D. personally performed the services described in this documentation, as scribed by Jerome Jasmine in my presence, and it is both accurate and complete.    The note accurately reflects work and decisions made by me.  Jerome Mcneil M.D.  4/25/2020  10:04 PM

## 2020-04-26 NOTE — ASSESSMENT & PLAN NOTE
Ruled out. Procal negative. Pneumonitis? Treatment as noted above.  I discussed finding of CT scan with patient.  Pulmonology consult appreciate recommendations.

## 2020-04-26 NOTE — ASSESSMENT & PLAN NOTE
Secondary to COPD exacerbation, CAP? COVID negative. Chest x-ray shows possible pneumonitis versus pneumonia.  Oxygen requirement still elevated.  Incentive spirometry.  RT protocol.  Due to his ongoing hypoxia and shortness of breath and history of cancer I ordered CTA pulmonary to evaluate for pulmonary embolism and did not show pulmonary embolism.  Echocardiogram did not show any acute abnormalities   I requested consult with pulmonology and discussed his current condition with Dr. Spangler discussed finding of CT scan as well as with echocardiogram.  I updated patient's son over phone.

## 2020-04-26 NOTE — ASSESSMENT & PLAN NOTE
SIRS criteria identified on admission:  Tachypnea, with respirations greater than 20 per minute. Secondary to dehydration on admission. Procal low and denies any symptoms.   He remains afebrile.  Continue to monitor.

## 2020-04-26 NOTE — ASSESSMENT & PLAN NOTE
Mildly elevated on admission and continuing to improve. In the setting of liver cancer.  He denies any symptoms of abdominal pain.  Continue outpatient follow-up with oncology

## 2020-04-26 NOTE — CARE PLAN
Problem: Oxygenation:  Goal: Maintain adequate oxygenation dependent on patient condition  Outcome: PROGRESSING AS EXPECTED  Note: 5lpm/oxymask     Problem: Bronchoconstriction:  Goal: Improve in air movement and diminished wheezing  Outcome: PROGRESSING AS EXPECTED  Note:     Respiratory Update    Treatment modality: SVN  Frequency:Q4    Pt tolerating current treatments well with no adverse reactions.

## 2020-04-26 NOTE — H&P
Hospital Medicine History & Physical Note    Date of Service  4/25/2020    Primary Care Physician  No primary care provider on file.    Consultants  None    Code Status  Full    Chief Complaint  Chief Complaint   Patient presents with   • Shortness of Breath     fever, cough, body aches, nausea, vomiting. low saturation at check in 75%       History of Presenting Illness  69 y.o. male who presented on 4/25/2020 with shortness of breath.  This is a pleasant older gentleman with a history of liver cancer and COPD on nocturnal oxygen at baseline.  He lives in [place], [place] and states that 1 month ago, he developed shortness of breath above his usual baseline.  Along with this, he has had a productive cough of scant clear sputum, subjective fevers, body aches, rib pain with coughing, wheezing, as well as occasional nausea and vomiting.  Patient states that he uses home inhalers and also try to use his home oxygen with no improvement in his symptoms.  Then medications for his liver cancer, he does not take anything prescribed at baseline.  He states that he has been quarantined at home for over a month, his son does shop for him and comes into the house to deliver the groceries.  Otherwise he has had no sick contacts and no travel.  Patient elected to drive to [place] for further treatment.  He denies any chest pain, abdominal pain, diarrhea or dysuria.    Review of Systems  Review of Systems   Constitutional: Positive for fever. Negative for chills.   HENT: Negative for congestion and sore throat.    Eyes: Negative for photophobia.   Respiratory: Positive for cough, shortness of breath and wheezing.    Cardiovascular: Negative for chest pain and palpitations.   Gastrointestinal: Positive for nausea and vomiting. Negative for abdominal pain and diarrhea.   Genitourinary: Negative for dysuria.   Musculoskeletal: Positive for back pain (Rib pain with cough) and myalgias.   Skin: Negative.    Neurological: Negative  for dizziness, tingling, focal weakness and headaches.   Psychiatric/Behavioral: Negative for depression and suicidal ideas.       Past Medical History  Past Medical History:   Diagnosis Date   • Cancer (HCC)    • Liver disease        Surgical History  Remote history of tonsillectomy    Family History  None reported    Social History  Social History     Tobacco Use   • Smoking status: Not on file   Substance Use Topics   • Alcohol use: Not on file   • Drug use: Not on file       Allergies  Not on File    Medications  No current facility-administered medications on file prior to encounter.      No current outpatient medications on file prior to encounter.       Physical Exam  Hemodynamics  No data recorded.      Pulse  Av.3  Min: 79  Max: 95    Blood Pressure : 144/76      Respiratory      Respiration: (!) 22, Pulse Oximetry: 95 %     $ MDI/DPI Given: MDI/DPI x 1, Work Of Breathing / Effort: Moderate;Increased Work of Breathing  RUL Breath Sounds: Expiratory Wheezes, RML Breath Sounds: Expiratory Wheezes;Diminished, RLL Breath Sounds: Expiratory Wheezes;Diminished, DEANGELO Breath Sounds: Expiratory Wheezes, LLL Breath Sounds: Expiratory Wheezes;Diminished    Physical Exam   Constitutional: He is oriented to person, place, and time. No distress.   HENT:   Head: Normocephalic and atraumatic.   Right Ear: External ear normal.   Left Ear: External ear normal.   Eyes: EOM are normal. Right eye exhibits no discharge. Left eye exhibits no discharge.   Neck: Neck supple. No JVD present.   Cardiovascular: Normal rate, regular rhythm and normal heart sounds.   Pulmonary/Chest: Effort normal. He has no wheezes. He exhibits no tenderness.   Able to speak in full sentences, breath sounds diminished diffusely but no wheezes heard   Abdominal: Soft. Bowel sounds are normal. He exhibits no distension. There is no abdominal tenderness.   Musculoskeletal:         General: No edema.   Neurological: He is alert and oriented to person,  place, and time. No cranial nerve deficit.   Skin: Skin is dry. He is not diaphoretic. No erythema.   Psychiatric: He has a normal mood and affect. His behavior is normal.   Nursing note and vitals reviewed.    Capillary refill less than 3 seconds, distal pulses intact    Laboratory:  Recent Labs     04/25/20 2040   WBC 6.2   RBC 3.62*   HEMOGLOBIN 13.7*   HEMATOCRIT 40.3*   .3*   MCH 37.8*   MCHC 34.0   RDW 71.5*   PLATELETCT 55*   MPV 11.0         No results for input(s): ALTSGPT, ASTSGOT, ALKPHOSPHAT, TBILIRUBIN, DBILIRUBIN, GAMMAGT, AMYLASE, LIPASE, ALB, PREALBUMIN, GLUCOSE in the last 72 hours.  Recent Labs     04/25/20 2040   APTT 35.5   INR 1.41*             No results found for: TROPONINI    Imaging  Dx-chest-portable (1 View)    Result Date: 4/25/2020 4/25/2020 8:47 PM HISTORY/REASON FOR EXAM:  Shortness of Breath. TECHNIQUE/EXAM DESCRIPTION AND NUMBER OF VIEWS: Single portable view of the chest. COMPARISON: None FINDINGS: Cardiomediastinal silhouette is normal. Aortic calcified atherosclerotic plaque. Central pulmonary vasculature appears prominent which may suggest pulmonary arterial hypertension. There is mild interstitial prominence. There is retrocardiac left basilar opacity. No significant pleural effusion or pneumothorax. No acute osseous abnormality.     1.  Central vascular and mild interstitial prominence which could represent vascular congestion. Cannot exclude interstitial pneumonitis. 2.  Possible retrocardiac left lower lobe opacity which may represent atelectasis and/or pneumonitis. 3.  CT chest may be performed for further evaluation.        Assessment/Plan:  Anticipate that patient will need greater than 2 midnights for management of the discussed medical issues.    * Acute and chronic respiratory failure with hypoxia (HCC)  Assessment & Plan  Patient carries a known history of COPD and is typically on nocturnal oxygen only at baseline.  He is currently hypoxic requiring a 15 L  nonrebreather mask to maintain saturation greater than 88%.  He appears comfortable and is able to speak in full sentences at this point however if his hypoxia returns, then we will escalate care to high flow oxygen.  While this may be COPD exacerbation only, given his one-month symptoms of cough and shortness of breath he will be ruled out for COVID-19.  Will avoid excessive IV fluids, patient received 2L in ED, and NSAIDs.  I will check a procalcitonin level, ferritin, CRP, and sputum sample.  Chest x-ray shows possible pneumonitis versus pneumonia therefore I will start him on empiric antibiotic therapy for presumed community-acquired pneumonia with Unasyn and doxycycline as he does have elevated QTC.  He will remain on respiratory therapy protocol, I am holding off on steroids at this point as I heard no wheezes during my bedside exam.  But we will consider initiating steroid therapy should he develop wheezes and he is ruled out for COVID-19.  Patient will remain on droplet, contact, and eyewear precaution until he is ruled out.    Elevated troponin  Assessment & Plan  No chest pain, likely ischemic demand in the setting of hypoxia.  Monitor on telemetry and trend troponin levels.    Transaminitis  Assessment & Plan  Patient with history of liver cancer, these elevations could be chronic but I have no old records to compare.  Check hepatitis panel, may have worsening due to poor perfusion in the setting of profound hypoxia, and underlying viral etiology cannot be ruled out.  Treatment as noted above.  Continue to trend chemistries.  Patient also with thrombocytopenia which may be related to liver disease but will check TEG and avoid anticoagulation.    Lactic acid increased  Assessment & Plan  Treating for suspected pneumonia, monitoring sputum samples and procalcitonin level, will plan to de-escalate therapy if these are negative as his lactic acid increase may also be due to hypoxia.  Continue to trend.  He  has already received IV fluids in the emergency room and I will not continue at this point as his blood pressure stable and he is being ruled out for COVID-19.    CAP (community acquired pneumonia)  Assessment & Plan  Treatment as noted above.    SIRS (systemic inflammatory response syndrome) (HCC)  Assessment & Plan  SIRS criteria identified on my evaluation include:  Tachypnea, with respirations greater than 20 per minute    Patient is tachypneic and has lactic acidosis, otherwise no leukocytosis, he is afebrile and blood pressure stable.  Treatment as noted above.      Prophylaxis: Lovenox for DVT prophylaxis, no PPI indicated, bowel protocol as needed

## 2020-04-27 PROBLEM — E87.20 LACTIC ACID INCREASED: Status: RESOLVED | Noted: 2020-04-25 | Resolved: 2020-04-27

## 2020-04-27 LAB
ALBUMIN SERPL BCP-MCNC: 2.1 G/DL (ref 3.2–4.9)
ALBUMIN/GLOB SERPL: 0.8 G/DL
ALP SERPL-CCNC: 125 U/L (ref 30–99)
ALT SERPL-CCNC: 41 U/L (ref 2–50)
ANION GAP SERPL CALC-SCNC: 7 MMOL/L (ref 7–16)
AST SERPL-CCNC: 63 U/L (ref 12–45)
BASOPHILS # BLD AUTO: 0.1 % (ref 0–1.8)
BASOPHILS # BLD: 0.01 K/UL (ref 0–0.12)
BILIRUB SERPL-MCNC: 1.3 MG/DL (ref 0.1–1.5)
BUN SERPL-MCNC: 24 MG/DL (ref 8–22)
CALCIUM SERPL-MCNC: 7.7 MG/DL (ref 8.5–10.5)
CHLORIDE SERPL-SCNC: 105 MMOL/L (ref 96–112)
CO2 SERPL-SCNC: 21 MMOL/L (ref 20–33)
CREAT SERPL-MCNC: 0.78 MG/DL (ref 0.5–1.4)
EOSINOPHIL # BLD AUTO: 0 K/UL (ref 0–0.51)
EOSINOPHIL NFR BLD: 0 % (ref 0–6.9)
ERYTHROCYTE [DISTWIDTH] IN BLOOD BY AUTOMATED COUNT: 70.3 FL (ref 35.9–50)
GLOBULIN SER CALC-MCNC: 2.7 G/DL (ref 1.9–3.5)
GLUCOSE SERPL-MCNC: 118 MG/DL (ref 65–99)
HCT VFR BLD AUTO: 31.6 % (ref 42–52)
HGB BLD-MCNC: 11 G/DL (ref 14–18)
IMM GRANULOCYTES # BLD AUTO: 0.06 K/UL (ref 0–0.11)
IMM GRANULOCYTES NFR BLD AUTO: 0.7 % (ref 0–0.9)
LACTATE BLD-SCNC: 1.8 MMOL/L (ref 0.5–2)
LYMPHOCYTES # BLD AUTO: 0.72 K/UL (ref 1–4.8)
LYMPHOCYTES NFR BLD: 8.9 % (ref 22–41)
MCH RBC QN AUTO: 38.5 PG (ref 27–33)
MCHC RBC AUTO-ENTMCNC: 34.8 G/DL (ref 33.7–35.3)
MCV RBC AUTO: 110.5 FL (ref 81.4–97.8)
MONOCYTES # BLD AUTO: 0.55 K/UL (ref 0–0.85)
MONOCYTES NFR BLD AUTO: 6.8 % (ref 0–13.4)
NEUTROPHILS # BLD AUTO: 6.71 K/UL (ref 1.82–7.42)
NEUTROPHILS NFR BLD: 83.5 % (ref 44–72)
NRBC # BLD AUTO: 0 K/UL
NRBC BLD-RTO: 0 /100 WBC
PLATELET # BLD AUTO: 52 K/UL (ref 164–446)
PMV BLD AUTO: 10.6 FL (ref 9–12.9)
POTASSIUM SERPL-SCNC: 4.2 MMOL/L (ref 3.6–5.5)
PROCALCITONIN SERPL-MCNC: 0.19 NG/ML
PROT SERPL-MCNC: 4.8 G/DL (ref 6–8.2)
RBC # BLD AUTO: 2.86 M/UL (ref 4.7–6.1)
SODIUM SERPL-SCNC: 133 MMOL/L (ref 135–145)
WBC # BLD AUTO: 8.1 K/UL (ref 4.8–10.8)

## 2020-04-27 PROCEDURE — 700102 HCHG RX REV CODE 250 W/ 637 OVERRIDE(OP): Performed by: HOSPITALIST

## 2020-04-27 PROCEDURE — 94760 N-INVAS EAR/PLS OXIMETRY 1: CPT

## 2020-04-27 PROCEDURE — A9270 NON-COVERED ITEM OR SERVICE: HCPCS | Performed by: INTERNAL MEDICINE

## 2020-04-27 PROCEDURE — 80053 COMPREHEN METABOLIC PANEL: CPT

## 2020-04-27 PROCEDURE — 770020 HCHG ROOM/CARE - TELE (206)

## 2020-04-27 PROCEDURE — A9270 NON-COVERED ITEM OR SERVICE: HCPCS | Performed by: HOSPITALIST

## 2020-04-27 PROCEDURE — 94640 AIRWAY INHALATION TREATMENT: CPT

## 2020-04-27 PROCEDURE — 84145 PROCALCITONIN (PCT): CPT

## 2020-04-27 PROCEDURE — 700111 HCHG RX REV CODE 636 W/ 250 OVERRIDE (IP): Performed by: HOSPITALIST

## 2020-04-27 PROCEDURE — 700105 HCHG RX REV CODE 258: Performed by: HOSPITALIST

## 2020-04-27 PROCEDURE — 83605 ASSAY OF LACTIC ACID: CPT

## 2020-04-27 PROCEDURE — 85025 COMPLETE CBC W/AUTO DIFF WBC: CPT

## 2020-04-27 PROCEDURE — 700102 HCHG RX REV CODE 250 W/ 637 OVERRIDE(OP): Performed by: INTERNAL MEDICINE

## 2020-04-27 PROCEDURE — 99232 SBSQ HOSP IP/OBS MODERATE 35: CPT | Performed by: HOSPITALIST

## 2020-04-27 RX ORDER — SILDENAFIL CITRATE 20 MG/1
60 TABLET ORAL 3 TIMES DAILY
Status: DISCONTINUED | OUTPATIENT
Start: 2020-04-27 | End: 2020-05-02 | Stop reason: HOSPADM

## 2020-04-27 RX ORDER — SILDENAFIL 25 MG/1
60 TABLET, FILM COATED ORAL 3 TIMES DAILY
Status: DISCONTINUED | OUTPATIENT
Start: 2020-04-27 | End: 2020-04-27

## 2020-04-27 RX ADMIN — AMPICILLIN SODIUM AND SULBACTAM SODIUM 3 G: 2; 1 INJECTION, POWDER, FOR SOLUTION INTRAMUSCULAR; INTRAVENOUS at 05:38

## 2020-04-27 RX ADMIN — SODIUM CHLORIDE: 9 INJECTION, SOLUTION INTRAVENOUS at 13:11

## 2020-04-27 RX ADMIN — ALBUTEROL SULFATE 2 PUFF: 90 AEROSOL, METERED RESPIRATORY (INHALATION) at 21:10

## 2020-04-27 RX ADMIN — SENNOSIDES AND DOCUSATE SODIUM 2 TABLET: 8.6; 5 TABLET ORAL at 05:38

## 2020-04-27 RX ADMIN — AMPICILLIN SODIUM AND SULBACTAM SODIUM 3 G: 2; 1 INJECTION, POWDER, FOR SOLUTION INTRAMUSCULAR; INTRAVENOUS at 17:07

## 2020-04-27 RX ADMIN — DOXYCYCLINE 100 MG: 100 TABLET, FILM COATED ORAL at 05:40

## 2020-04-27 RX ADMIN — OXYCODONE HYDROCHLORIDE 5 MG: 5 TABLET ORAL at 13:11

## 2020-04-27 RX ADMIN — SODIUM CHLORIDE: 9 INJECTION, SOLUTION INTRAVENOUS at 00:00

## 2020-04-27 RX ADMIN — GUAIFENESIN AND DEXTROMETHORPHAN 10 ML: 100; 10 SYRUP ORAL at 13:11

## 2020-04-27 RX ADMIN — DOXYCYCLINE 100 MG: 100 TABLET, FILM COATED ORAL at 17:07

## 2020-04-27 RX ADMIN — SILDENAFIL CITRATE 60 MG: 20 TABLET ORAL at 17:52

## 2020-04-27 RX ADMIN — AMPICILLIN SODIUM AND SULBACTAM SODIUM 3 G: 2; 1 INJECTION, POWDER, FOR SOLUTION INTRAMUSCULAR; INTRAVENOUS at 13:06

## 2020-04-27 RX ADMIN — ASPIRIN 81 MG: 81 TABLET, COATED ORAL at 05:38

## 2020-04-27 RX ADMIN — SILDENAFIL CITRATE 60 MG: 20 TABLET ORAL at 14:10

## 2020-04-27 RX ADMIN — ALBUTEROL SULFATE 2 PUFF: 90 AEROSOL, METERED RESPIRATORY (INHALATION) at 11:33

## 2020-04-27 RX ADMIN — TRAZODONE HYDROCHLORIDE 50 MG: 50 TABLET ORAL at 20:01

## 2020-04-27 RX ADMIN — ALBUTEROL SULFATE 2 PUFF: 90 AEROSOL, METERED RESPIRATORY (INHALATION) at 00:00

## 2020-04-27 RX ADMIN — ALBUTEROL SULFATE 2 PUFF: 90 AEROSOL, METERED RESPIRATORY (INHALATION) at 17:09

## 2020-04-27 RX ADMIN — ALBUTEROL SULFATE 2 PUFF: 90 AEROSOL, METERED RESPIRATORY (INHALATION) at 03:53

## 2020-04-27 RX ADMIN — GUAIFENESIN AND DEXTROMETHORPHAN 10 ML: 100; 10 SYRUP ORAL at 20:01

## 2020-04-27 RX ADMIN — ALBUTEROL SULFATE 2 PUFF: 90 AEROSOL, METERED RESPIRATORY (INHALATION) at 06:58

## 2020-04-27 ASSESSMENT — ENCOUNTER SYMPTOMS
CHILLS: 0
MYALGIAS: 1
ABDOMINAL PAIN: 0
COUGH: 1
SHORTNESS OF BREATH: 1
FALLS: 0
PALPITATIONS: 0
SPEECH CHANGE: 0
WEAKNESS: 1
VOMITING: 0
SPUTUM PRODUCTION: 0
DIZZINESS: 0
FEVER: 1
LOSS OF CONSCIOUSNESS: 0
PSYCHIATRIC NEGATIVE: 1
NAUSEA: 1
HEADACHES: 0

## 2020-04-27 ASSESSMENT — PATIENT HEALTH QUESTIONNAIRE - PHQ9
SUM OF ALL RESPONSES TO PHQ9 QUESTIONS 1 AND 2: 0
2. FEELING DOWN, DEPRESSED, IRRITABLE, OR HOPELESS: NOT AT ALL
1. LITTLE INTEREST OR PLEASURE IN DOING THINGS: NOT AT ALL

## 2020-04-27 ASSESSMENT — PAIN SCALES - WONG BAKER: WONGBAKER_NUMERICALRESPONSE: DOESN'T HURT AT ALL

## 2020-04-27 NOTE — PROGRESS NOTES
L wrist IV infiltrate. IV removed. Pt medicated with prn oxycodone for pain. Pt denies any other needs at this time. Will continue to monitor.

## 2020-04-27 NOTE — PROGRESS NOTES
With bp range of 84/50-87/52, asymptomatic, given trazodone and oxycodone earlier, denies dizziness. Will recheck bp later. To continue to monitor.

## 2020-04-27 NOTE — DISCHARGE PLANNING
Care Transition Team Assessment    Chart reviewed. Lives in [facility] with family. No PCP listed. No insurance listed. Unknown needs @ present time.    Information Source  Information Given By: Other (Comments)(Chart rerveiwed)    Readmission Evaluation  Is this a readmission?: No    Interdisciplinary Discharge Planning  Does Admitting Nurse Feel This Could be a Complex Discharge?: No  Primary Care Physician: Not listed  Lives with - Patient's Self Care Capacity: Adult Children  Patient or legal guardian wants to designate a caregiver (see row info): No  Support Systems: Family Member(s)  Housing / Facility: [address]  Do You Take your Prescribed Medications Regularly: Yes  Able to Return to Previous ADL's: Yes  Mobility Issues: No  Prior Services: None  Patient Expects to be Discharged to:: Home  Assistance Needed: No  Durable Medical Equipment: Not Applicable    Discharge Preparedness  What are your discharge supports?: Child    Anticipated Discharge Information  Anticipated discharge disposition: Home  Discharge Address[id number]  Discharge Contact Phone Number: [phone]

## 2020-04-27 NOTE — CARE PLAN
Problem: Respiratory:  Goal: Respiratory status will improve  Outcome: PROGRESSING AS EXPECTED  Intervention: Collaborate with respiratory therapist and Interdisciplinary Team on treatment measures to improve respiratory function  Note: RT protocol in place.     Problem: Pain Management  Goal: Pain level will decrease to patient's comfort goal  Outcome: PROGRESSING AS EXPECTED  Intervention: Follow pain managment plan developed in collaboration with patient and Interdisciplinary Team  Note: Administer prescribed pain meds.

## 2020-04-27 NOTE — PROGRESS NOTES
"[facility] Medicine Daily Progress Note    Date of Service  4/27/2020    Chief Complaint  69 y.o. male admitted 4/25/2020 with flu like symptoms and SOB.     Interval Problem Update  States he's \"been better\" and he's eager to go home. Appetite improved today but still hypoxic and requiring 5L NC.     Consultants/Specialty  None    Code Status  Full    Disposition  Anticipate home in the next 1-2 days.    Review of Systems  Review of Systems   Constitutional: Positive for fever and malaise/fatigue. Negative for chills.   Respiratory: Positive for cough and shortness of breath. Negative for sputum production.    Cardiovascular: Negative for chest pain, palpitations and leg swelling.   Gastrointestinal: Positive for nausea. Negative for abdominal pain and vomiting.   Genitourinary: Negative for dysuria.   Musculoskeletal: Positive for myalgias. Negative for falls.   Neurological: Positive for weakness. Negative for dizziness, speech change, loss of consciousness and headaches.   Psychiatric/Behavioral: Negative.    All other systems reviewed and are negative.       Physical Exam  Temp:  [36.9 °C (98.4 °F)-37.2 °C (99 °F)] 37.1 °C (98.8 °F)  Pulse:  [56-84] 56  Resp:  [16-20] 16  BP: ()/(50-69) 88/57  SpO2:  [91 %-95 %] 95 %    Physical Exam  Vitals signs reviewed.   Constitutional:       General: He is not in acute distress.     Appearance: He is not diaphoretic.   HENT:      Head: Normocephalic.      Mouth/Throat:      Mouth: Mucous membranes are dry.   Eyes:      General:         Right eye: No discharge.         Left eye: No discharge.      Extraocular Movements: Extraocular movements intact.   Neck:      Musculoskeletal: Normal range of motion. No muscular tenderness.   Cardiovascular:      Rate and Rhythm: Normal rate and regular rhythm.      Pulses: Normal pulses.   Pulmonary:      Effort: Pulmonary effort is normal. No respiratory distress.      Breath sounds: No wheezing or rales.   Abdominal:      " Palpations: Abdomen is soft.      Tenderness: There is no abdominal tenderness. There is no guarding.   Musculoskeletal:         General: No tenderness or deformity.   Skin:     General: Skin is dry.   Neurological:      General: No focal deficit present.      Mental Status: He is alert and oriented to person, place, and time.   Psychiatric:         Mood and Affect: Mood normal.         Speech: Speech normal.         Behavior: Behavior normal.     Patient seen and examined today on 4/27, unchanged from 4/26.     Fluids  No intake or output data in the 24 hours ending 04/27/20 0729    Laboratory  Recent Labs     04/25/20 2040 04/26/20  0010 04/27/20  0206   WBC 6.2 4.5* 8.1   RBC 3.62* 3.27* 2.86*   HEMOGLOBIN 13.7* 12.5* 11.0*   HEMATOCRIT 40.3* 35.8* 31.6*   .3* 109.5* 110.5*   MCH 37.8* 38.2* 38.5*   MCHC 34.0 34.9 34.8   RDW 71.5* 69.7* 70.3*   PLATELETCT 55* 47* 52*   MPV 11.0 11.7 10.6     Recent Labs     04/25/20 2040 04/26/20  0010 04/27/20  0206   SODIUM 135 137 133*   POTASSIUM 4.2 3.7 4.2   CHLORIDE 104 106 105   CO2 17* 16* 21   GLUCOSE 149* 163* 118*   BUN 20 18 24*   CREATININE 0.97 0.77 0.78   CALCIUM 8.2* 7.8* 7.7*     Recent Labs     04/25/20 2040   APTT 35.5   INR 1.41*               Imaging  DX-CHEST-PORTABLE (1 VIEW)   Final Result      1.  Central vascular and mild interstitial prominence which could represent vascular congestion. Cannot exclude interstitial pneumonitis.   2.  Possible retrocardiac left lower lobe opacity which may represent atelectasis and/or pneumonitis.   3.  CT chest may be performed for further evaluation.           Assessment/Plan  * Acute and chronic respiratory failure with hypoxia (HCC)- (present on admission)  Assessment & Plan  Secondary to COPD exacerbation, CAP? COVID negative. Chest x-ray shows possible pneumonitis versus pneumonia.  - weaned from 15L face mask to 5L  - ambulatory O2 saturation pending  - s/p 2L IVF in the ED  - IS and increase  mobilization  - PT/OT pending  - RT protocol  - albuterol PRN  - d/c abx    Liver cancer (HCC)  Assessment & Plan  Discussed with oncology on-call over the weekend. Patient can resume home lenvatinib on discharge, once acute issues have resolved. Discussed hospice, patient siad that he can get it set up at home after discharge if he decides he wants it. Not interested at this time.    Pulmonary hypertension (HCC)  Assessment & Plan  - resume home sildenafil     COPD (chronic obstructive pulmonary disease) (HCC)- (present on admission)  Assessment & Plan  Denies wheezing or recent exacerbations.   - RT protocol  - albuterol PRN    Thrombocytopenia (HCC)- (present on admission)  Assessment & Plan  History of liver cancer, unknown baseline. Stable ~50. No evidence of active bleeding  -  Hold anticoagulation.    Elevated troponin  Assessment & Plan  Resolved. No chest pain, likely ischemic demand in the setting of hypoxia.   - continue tele  - monitor clinically    Transaminitis- (present on admission)  Assessment & Plan  Mildly elevated on admission and continuing to improve. In the setting of liver cancer.  - trend and monitor clinically     CAP (community acquired pneumonia)- (present on admission)  Assessment & Plan  Ruled out. Procal negative. Pneumonitis? Treatment as noted above.    SIRS (systemic inflammatory response syndrome) (HCC)- (present on admission)  Assessment & Plan  SIRS criteria identified on admission:  Tachypnea, with respirations greater than 20 per minute. Secondary to dehydration on admission. Procal low and denies any symptoms.   - s/p gentle IVF  - lactic acidosis resolved       VTE prophylaxis: SCDs

## 2020-04-28 ENCOUNTER — APPOINTMENT (OUTPATIENT)
Dept: CARDIOLOGY | Facility: MEDICAL CENTER | Age: 70
DRG: 190 | End: 2020-04-28
Attending: INTERNAL MEDICINE
Payer: MEDICARE

## 2020-04-28 ENCOUNTER — APPOINTMENT (OUTPATIENT)
Dept: RADIOLOGY | Facility: MEDICAL CENTER | Age: 70
DRG: 190 | End: 2020-04-28
Attending: INTERNAL MEDICINE
Payer: MEDICARE

## 2020-04-28 LAB
ALBUMIN SERPL BCP-MCNC: 2.1 G/DL (ref 3.2–4.9)
ALBUMIN/GLOB SERPL: 0.8 G/DL
ALP SERPL-CCNC: 143 U/L (ref 30–99)
ALT SERPL-CCNC: 51 U/L (ref 2–50)
ANION GAP SERPL CALC-SCNC: 7 MMOL/L (ref 7–16)
AST SERPL-CCNC: 85 U/L (ref 12–45)
BASOPHILS # BLD AUTO: 0.4 % (ref 0–1.8)
BASOPHILS # BLD: 0.02 K/UL (ref 0–0.12)
BILIRUB SERPL-MCNC: 1.8 MG/DL (ref 0.1–1.5)
BUN SERPL-MCNC: 20 MG/DL (ref 8–22)
CALCIUM SERPL-MCNC: 7.6 MG/DL (ref 8.5–10.5)
CHLORIDE SERPL-SCNC: 105 MMOL/L (ref 96–112)
CO2 SERPL-SCNC: 20 MMOL/L (ref 20–33)
COVID ORDER STATUS COVID19: NORMAL
CREAT SERPL-MCNC: 0.64 MG/DL (ref 0.5–1.4)
CRP SERPL HS-MCNC: 2.68 MG/DL (ref 0–0.75)
D DIMER PPP IA.FEU-MCNC: 17.24 UG/ML (FEU) (ref 0–0.5)
EOSINOPHIL # BLD AUTO: 0.2 K/UL (ref 0–0.51)
EOSINOPHIL NFR BLD: 3.6 % (ref 0–6.9)
ERYTHROCYTE [DISTWIDTH] IN BLOOD BY AUTOMATED COUNT: 69.5 FL (ref 35.9–50)
ERYTHROCYTE [DISTWIDTH] IN BLOOD BY AUTOMATED COUNT: 69.9 FL (ref 35.9–50)
FERRITIN SERPL-MCNC: 278 NG/ML (ref 22–322)
GLOBULIN SER CALC-MCNC: 2.8 G/DL (ref 1.9–3.5)
GLUCOSE SERPL-MCNC: 97 MG/DL (ref 65–99)
HCT VFR BLD AUTO: 31.9 % (ref 42–52)
HCT VFR BLD AUTO: 34.5 % (ref 42–52)
HCV RNA SERPL NAA+PROBE-ACNC: NOT DETECTED IU/ML
HCV RNA SERPL NAA+PROBE-LOG IU: NOT DETECTED LOG IU/ML
HCV RNA SERPL QL NAA+PROBE: NOT DETECTED
HGB BLD-MCNC: 11.3 G/DL (ref 14–18)
HGB BLD-MCNC: 12.1 G/DL (ref 14–18)
IMM GRANULOCYTES # BLD AUTO: 0.03 K/UL (ref 0–0.11)
IMM GRANULOCYTES NFR BLD AUTO: 0.5 % (ref 0–0.9)
LDH SERPL L TO P-CCNC: 278 U/L (ref 107–266)
LYMPHOCYTES # BLD AUTO: 1.15 K/UL (ref 1–4.8)
LYMPHOCYTES NFR BLD: 20.4 % (ref 22–41)
MCH RBC QN AUTO: 38.4 PG (ref 27–33)
MCH RBC QN AUTO: 38.7 PG (ref 27–33)
MCHC RBC AUTO-ENTMCNC: 35.1 G/DL (ref 33.7–35.3)
MCHC RBC AUTO-ENTMCNC: 35.4 G/DL (ref 33.7–35.3)
MCV RBC AUTO: 109.2 FL (ref 81.4–97.8)
MCV RBC AUTO: 109.5 FL (ref 81.4–97.8)
MONOCYTES # BLD AUTO: 0.49 K/UL (ref 0–0.85)
MONOCYTES NFR BLD AUTO: 8.7 % (ref 0–13.4)
NEUTROPHILS # BLD AUTO: 3.74 K/UL (ref 1.82–7.42)
NEUTROPHILS NFR BLD: 66.4 % (ref 44–72)
NRBC # BLD AUTO: 0 K/UL
NRBC BLD-RTO: 0 /100 WBC
PLATELET # BLD AUTO: 52 K/UL (ref 164–446)
PLATELET # BLD AUTO: 58 K/UL (ref 164–446)
PMV BLD AUTO: 10.3 FL (ref 9–12.9)
PMV BLD AUTO: 12 FL (ref 9–12.9)
POTASSIUM SERPL-SCNC: 4.4 MMOL/L (ref 3.6–5.5)
PROCALCITONIN SERPL-MCNC: 0.11 NG/ML
PROCALCITONIN SERPL-MCNC: 0.13 NG/ML
PROT SERPL-MCNC: 4.9 G/DL (ref 6–8.2)
RBC # BLD AUTO: 2.92 M/UL (ref 4.7–6.1)
RBC # BLD AUTO: 3.15 M/UL (ref 4.7–6.1)
SODIUM SERPL-SCNC: 132 MMOL/L (ref 135–145)
WBC # BLD AUTO: 5.6 K/UL (ref 4.8–10.8)
WBC # BLD AUTO: 5.6 K/UL (ref 4.8–10.8)

## 2020-04-28 PROCEDURE — 84145 PROCALCITONIN (PCT): CPT

## 2020-04-28 PROCEDURE — 700102 HCHG RX REV CODE 250 W/ 637 OVERRIDE(OP): Performed by: HOSPITALIST

## 2020-04-28 PROCEDURE — 94640 AIRWAY INHALATION TREATMENT: CPT

## 2020-04-28 PROCEDURE — 83615 LACTATE (LD) (LDH) ENZYME: CPT

## 2020-04-28 PROCEDURE — 85379 FIBRIN DEGRADATION QUANT: CPT

## 2020-04-28 PROCEDURE — 99221 1ST HOSP IP/OBS SF/LOW 40: CPT | Performed by: INTERNAL MEDICINE

## 2020-04-28 PROCEDURE — 700102 HCHG RX REV CODE 250 W/ 637 OVERRIDE(OP): Performed by: INTERNAL MEDICINE

## 2020-04-28 PROCEDURE — 85027 COMPLETE CBC AUTOMATED: CPT

## 2020-04-28 PROCEDURE — 94669 MECHANICAL CHEST WALL OSCILL: CPT

## 2020-04-28 PROCEDURE — 99232 SBSQ HOSP IP/OBS MODERATE 35: CPT | Performed by: INTERNAL MEDICINE

## 2020-04-28 PROCEDURE — G2023 SPECIMEN COLLECT COVID-19: HCPCS | Performed by: INTERNAL MEDICINE

## 2020-04-28 PROCEDURE — A9270 NON-COVERED ITEM OR SERVICE: HCPCS | Performed by: INTERNAL MEDICINE

## 2020-04-28 PROCEDURE — 700117 HCHG RX CONTRAST REV CODE 255: Performed by: INTERNAL MEDICINE

## 2020-04-28 PROCEDURE — 97165 OT EVAL LOW COMPLEX 30 MIN: CPT

## 2020-04-28 PROCEDURE — 82728 ASSAY OF FERRITIN: CPT

## 2020-04-28 PROCEDURE — 700111 HCHG RX REV CODE 636 W/ 250 OVERRIDE (IP): Performed by: HOSPITALIST

## 2020-04-28 PROCEDURE — 85025 COMPLETE CBC W/AUTO DIFF WBC: CPT

## 2020-04-28 PROCEDURE — 71275 CT ANGIOGRAPHY CHEST: CPT

## 2020-04-28 PROCEDURE — 97162 PT EVAL MOD COMPLEX 30 MIN: CPT

## 2020-04-28 PROCEDURE — 94760 N-INVAS EAR/PLS OXIMETRY 1: CPT

## 2020-04-28 PROCEDURE — 80053 COMPREHEN METABOLIC PANEL: CPT

## 2020-04-28 PROCEDURE — A9270 NON-COVERED ITEM OR SERVICE: HCPCS | Performed by: HOSPITALIST

## 2020-04-28 PROCEDURE — 700105 HCHG RX REV CODE 258: Performed by: HOSPITALIST

## 2020-04-28 PROCEDURE — 86140 C-REACTIVE PROTEIN: CPT

## 2020-04-28 PROCEDURE — 770020 HCHG ROOM/CARE - TELE (206)

## 2020-04-28 RX ADMIN — SILDENAFIL CITRATE 60 MG: 20 TABLET ORAL at 18:11

## 2020-04-28 RX ADMIN — DOXYCYCLINE 100 MG: 100 TABLET, FILM COATED ORAL at 05:36

## 2020-04-28 RX ADMIN — ACETAMINOPHEN 650 MG: 325 TABLET, FILM COATED ORAL at 00:25

## 2020-04-28 RX ADMIN — IOHEXOL 80 ML: 350 INJECTION, SOLUTION INTRAVENOUS at 14:27

## 2020-04-28 RX ADMIN — AMPICILLIN SODIUM AND SULBACTAM SODIUM 3 G: 2; 1 INJECTION, POWDER, FOR SOLUTION INTRAMUSCULAR; INTRAVENOUS at 18:11

## 2020-04-28 RX ADMIN — SILDENAFIL CITRATE 60 MG: 20 TABLET ORAL at 05:36

## 2020-04-28 RX ADMIN — AMPICILLIN SODIUM AND SULBACTAM SODIUM 3 G: 2; 1 INJECTION, POWDER, FOR SOLUTION INTRAMUSCULAR; INTRAVENOUS at 11:59

## 2020-04-28 RX ADMIN — ASPIRIN 81 MG: 81 TABLET, COATED ORAL at 05:35

## 2020-04-28 RX ADMIN — AMPICILLIN SODIUM AND SULBACTAM SODIUM 3 G: 2; 1 INJECTION, POWDER, FOR SOLUTION INTRAMUSCULAR; INTRAVENOUS at 05:39

## 2020-04-28 RX ADMIN — OXYCODONE HYDROCHLORIDE 5 MG: 5 TABLET ORAL at 00:25

## 2020-04-28 RX ADMIN — ALBUTEROL SULFATE 2 PUFF: 90 AEROSOL, METERED RESPIRATORY (INHALATION) at 21:11

## 2020-04-28 RX ADMIN — ALBUTEROL SULFATE 2 PUFF: 90 AEROSOL, METERED RESPIRATORY (INHALATION) at 11:17

## 2020-04-28 RX ADMIN — OXYCODONE HYDROCHLORIDE 5 MG: 5 TABLET ORAL at 18:15

## 2020-04-28 RX ADMIN — ALBUTEROL SULFATE 2 PUFF: 90 AEROSOL, METERED RESPIRATORY (INHALATION) at 08:04

## 2020-04-28 RX ADMIN — DOXYCYCLINE 100 MG: 100 TABLET, FILM COATED ORAL at 18:11

## 2020-04-28 RX ADMIN — AMPICILLIN SODIUM AND SULBACTAM SODIUM 3 G: 2; 1 INJECTION, POWDER, FOR SOLUTION INTRAMUSCULAR; INTRAVENOUS at 00:27

## 2020-04-28 RX ADMIN — TRAZODONE HYDROCHLORIDE 50 MG: 50 TABLET ORAL at 21:12

## 2020-04-28 RX ADMIN — SILDENAFIL CITRATE 60 MG: 20 TABLET ORAL at 12:00

## 2020-04-28 RX ADMIN — ALBUTEROL SULFATE 2 PUFF: 90 AEROSOL, METERED RESPIRATORY (INHALATION) at 16:06

## 2020-04-28 ASSESSMENT — ENCOUNTER SYMPTOMS
VOMITING: 0
HEADACHES: 0
DOUBLE VISION: 0
MYALGIAS: 0
GASTROINTESTINAL NEGATIVE: 1
DIZZINESS: 0
WEAKNESS: 1
ORTHOPNEA: 0
EYE PAIN: 0
SPEECH CHANGE: 0
BACK PAIN: 0
MUSCULOSKELETAL NEGATIVE: 1
TINGLING: 0
NAUSEA: 0
SENSORY CHANGE: 0
NECK PAIN: 0
SHORTNESS OF BREATH: 1
DIARRHEA: 0
CARDIOVASCULAR NEGATIVE: 1
FEVER: 0
CONSTIPATION: 0
TREMORS: 0
PALPITATIONS: 0
SPUTUM PRODUCTION: 0
PSYCHIATRIC NEGATIVE: 1
ABDOMINAL PAIN: 0
CHILLS: 0
PHOTOPHOBIA: 0
HALLUCINATIONS: 0
BLURRED VISION: 0
WEIGHT LOSS: 0
EYES NEGATIVE: 1
FOCAL WEAKNESS: 0
COUGH: 1

## 2020-04-28 ASSESSMENT — COGNITIVE AND FUNCTIONAL STATUS - GENERAL
TOILETING: A LITTLE
DRESSING REGULAR LOWER BODY CLOTHING: A LITTLE
SUGGESTED CMS G CODE MODIFIER MOBILITY: CJ
MOVING TO AND FROM BED TO CHAIR: A LITTLE
MOBILITY SCORE: 22
CLIMB 3 TO 5 STEPS WITH RAILING: A LITTLE
HELP NEEDED FOR BATHING: A LITTLE
DAILY ACTIVITIY SCORE: 21
SUGGESTED CMS G CODE MODIFIER DAILY ACTIVITY: CJ

## 2020-04-28 ASSESSMENT — GAIT ASSESSMENTS
GAIT LEVEL OF ASSIST: SUPERVISED
DISTANCE (FEET): 22

## 2020-04-28 ASSESSMENT — LIFESTYLE VARIABLES: SUBSTANCE_ABUSE: 0

## 2020-04-28 ASSESSMENT — ACTIVITIES OF DAILY LIVING (ADL): TOILETING: INDEPENDENT

## 2020-04-28 NOTE — THERAPY
"Occupational Therapy Evaluation completed.   Functional Status: Pt is a 70 y/o male admitted with SOB and flu like symptoms found to have PNA. He has a hx of liver CA. He was pleasant and cooperative. Supv basic self care, functional mobility, functional transfers w/o AD. While grooming at the sink he became more SOB, and was assisted back to bed. O2 sats in 70s on 8L o2. Took about 3-4 min for him to come back up to 90s. Discussed energy conservation, appropriate AE/DME, and use of o2 during ADL routine. Will follow up likely one more time closer to d/c to ensure that he is tolerating ADL routine with current O2 needs.  Plan of Care: Will benefit from Occupational Therapy 3 times per week  Discharge Recommendations:  Equipment: Shower Chair. Recommend home health for continued occupational therapy services.       See \"Rehab Therapy-Acute\" Patient Summary Report for complete documentation.    "

## 2020-04-28 NOTE — PROGRESS NOTES
[facility] Medicine Daily Progress Note    Date of Service  4/28/2020    Chief Complaint  69 y.o. male admitted 4/25/2020 with flu like symptoms and SOB.     Interval Problem Update    I evaluated and examined him at the bedside.  He continued to have shortness of breath.  Due to his elevated oxygen requirement I ordered CTA pulmonary as he has a history of cancer and he was hypotensive during the earlier course of hospitalization.  I requested consult with pulmonology and discussed with Dr. Spangler.      Consultants/Specialty  None    Code Status  Full    Disposition  Anticipate home in the next 1-2 days.    Review of Systems  Review of Systems   Constitutional: Positive for malaise/fatigue. Negative for chills, fever and weight loss.   HENT: Negative for hearing loss and tinnitus.    Eyes: Negative for blurred vision, double vision, photophobia and pain.   Respiratory: Positive for cough and shortness of breath. Negative for sputum production.    Cardiovascular: Negative for chest pain, palpitations, orthopnea and leg swelling.   Gastrointestinal: Negative for abdominal pain, constipation, diarrhea, nausea and vomiting.   Genitourinary: Negative for dysuria, frequency and urgency.   Musculoskeletal: Negative for back pain, joint pain, myalgias and neck pain.   Skin: Negative for rash.   Neurological: Negative for dizziness, tingling, tremors, sensory change, speech change, focal weakness and headaches.   Psychiatric/Behavioral: Negative for hallucinations and substance abuse.   All other systems reviewed and are negative.       Physical Exam  Temp:  [36.5 °C (97.7 °F)-37.9 °C (100.3 °F)] 36.5 °C (97.7 °F)  Pulse:  [61-96] 78  Resp:  [12-26] 20  BP: (105-149)/(56-79) 138/60  SpO2:  [88 %-94 %] 94 %    Physical Exam  Vitals signs reviewed.   Constitutional:       General: He is not in acute distress.  HENT:      Head: Normocephalic and atraumatic. No contusion.      Right Ear: External ear normal.      Left Ear:  External ear normal.      Nose: Nose normal.      Mouth/Throat:      Mouth: Mucous membranes are dry.      Pharynx: No oropharyngeal exudate.      Comments: Oxygen mask present  Eyes:      General:         Right eye: No discharge.         Left eye: No discharge.      Pupils: Pupils are equal, round, and reactive to light.   Neck:      Musculoskeletal: No neck rigidity or muscular tenderness.   Cardiovascular:      Rate and Rhythm: Normal rate and regular rhythm.      Heart sounds: No murmur. No friction rub. No gallop.    Pulmonary:      Effort: Pulmonary effort is normal.      Breath sounds: No wheezing or rhonchi.      Comments: Decreased breath sounds bilateral  Abdominal:      General: Bowel sounds are normal. There is no distension.      Palpations: Abdomen is soft.      Tenderness: There is no abdominal tenderness. There is no rebound.   Musculoskeletal: Normal range of motion.         General: No swelling or tenderness.   Skin:     General: Skin is warm and dry.      Coloration: Skin is not jaundiced.   Neurological:      General: No focal deficit present.      Mental Status: He is alert.      Cranial Nerves: No cranial nerve deficit.      Sensory: No sensory deficit.   Psychiatric:         Mood and Affect: Mood normal.       Fluids    Intake/Output Summary (Last 24 hours) at 4/28/2020 1414  Last data filed at 4/28/2020 0609  Gross per 24 hour   Intake 450 ml   Output 500 ml   Net -50 ml       Laboratory  Recent Labs     04/26/20  0010 04/27/20  0206 04/28/20 0415   WBC 4.5* 8.1 5.6   RBC 3.27* 2.86* 2.92*   HEMOGLOBIN 12.5* 11.0* 11.3*   HEMATOCRIT 35.8* 31.6* 31.9*   .5* 110.5* 109.2*   MCH 38.2* 38.5* 38.7*   MCHC 34.9 34.8 35.4*   RDW 69.7* 70.3* 69.5*   PLATELETCT 47* 52* 58*   MPV 11.7 10.6 12.0     Recent Labs     04/26/20  0010 04/27/20  0206 04/28/20  0415   SODIUM 137 133* 132*   POTASSIUM 3.7 4.2 4.4   CHLORIDE 106 105 105   CO2 16* 21 20   GLUCOSE 163* 118* 97   BUN 18 24* 20   CREATININE  0.77 0.78 0.64   CALCIUM 7.8* 7.7* 7.6*     Recent Labs     04/25/20 2040   APTT 35.5   INR 1.41*               Imaging  DX-CHEST-PORTABLE (1 VIEW)   Final Result      1.  Central vascular and mild interstitial prominence which could represent vascular congestion. Cannot exclude interstitial pneumonitis.   2.  Possible retrocardiac left lower lobe opacity which may represent atelectasis and/or pneumonitis.   3.  CT chest may be performed for further evaluation.      CT-CTA CHEST PULMONARY ARTERY W/ RECONS    (Results Pending)        Assessment/Plan  * Acute and chronic respiratory failure with hypoxia (HCC)- (present on admission)  Assessment & Plan  Secondary to COPD exacerbation, CAP? COVID negative. Chest x-ray shows possible pneumonitis versus pneumonia.  Oxygen requirement still elevated.  Incentive spirometry.  RT protocol.  Due to his ongoing hypoxia and shortness of breath and history of cancer I ordered CTA pulmonary to evaluate for pulmonary embolism.  I requested consult with pulmonology and discussed his current condition with Dr. Spangler.      Liver cancer (HCC)  Assessment & Plan  Discussed with oncology on-call over the weekend. Patient can resume home lenvatinib on discharge, once acute issues have resolved.   Palliative care consult requested.  Patient reported that he follows oncology at [facility] in [place]    Pulmonary hypertension (HCC)  Assessment & Plan  Continue outpatient sildenafil.    COPD (chronic obstructive pulmonary disease) (HCC)- (present on admission)  Assessment & Plan  Denies wheezing or recent exacerbations.   Continue to monitor.    Thrombocytopenia (HCC)- (present on admission)  Assessment & Plan  History of liver cancer, unknown baseline. Stable ~50. No evidence of active bleeding  Continue to monitor.  Hold anticoagulation.    Elevated troponin  Assessment & Plan  Now resolved.  No chest pain  Continue to monitor.    Transaminitis- (present on admission)  Assessment &  Plan  Mildly elevated on admission and continuing to improve. In the setting of liver cancer.  He denies any symptoms of abdominal pain.  Continue outpatient follow-up with oncology    CAP (community acquired pneumonia)- (present on admission)  Assessment & Plan  Ruled out. Procal negative. Pneumonitis? Treatment as noted above.  Ordered CTA pulmonary to evaluate further.  Pulmonology consult.    SIRS (systemic inflammatory response syndrome) (HCC)- (present on admission)  Assessment & Plan  SIRS criteria identified on admission:  Tachypnea, with respirations greater than 20 per minute. Secondary to dehydration on admission. Procal low and denies any symptoms.   He remains afebrile.  Continue to monitor.     I personally discussed case with Dr. Spangler over phone regarding his current medical condition.    VTE prophylaxis: SCDs

## 2020-04-28 NOTE — PROGRESS NOTES
Report received from day shift RN, assumed care of patient at 1900. Patient AAOx4, PERRL, speech clear, ARSHAD. Currently resting in bed, watching TV. Increased WOB noted. Prn Robitussin given for cough. Patient denies any other needs, distress, or pain at this time. Will continue to monitor.

## 2020-04-28 NOTE — CARE PLAN
Problem: Pain Management  Goal: Pain level will decrease to patient's comfort goal  Outcome: PROGRESSING AS EXPECTED   R/T: rib pain  Patient c/o rib pain related to coughing, 10/10 at worst. Prn oxycodone administered per eMAR. Frequent pain reassessments completed, will continue to monitor.    Problem: Respiratory:  Goal: Respiratory status will improve  Outcome: PROGRESSING SLOWER THAN EXPECTED   R/T: O2 needs  Patient's SpO2 ranging 86-92 on 5L, desats with any activity or speech. Oxymask increased from 5 to 7L O2. SpO2 improved to 92-96%. Respiratory following. Will continue to monitor.

## 2020-04-28 NOTE — THERAPY
"Physical Therapy Evaluation completed.   Bed Mobility:  Supine to Sit: Supervised  Transfers: Sit to Stand: Supervised  Gait: Level Of Assist: Supervised with No Equipment Needed       Plan of Care: Will benefit from Physical Therapy 2 times per week  Discharge Recommendations: Equipment: Will Continue to Assess for Equipment Needs.    See \"Rehab Therapy-Acute\" Patient Summary Report for complete documentation.     Pt is a 70yo male admitted with respiratory failure and hypoxia. Pt on 6L O2 with oxymask at this time. Pt seen for PT evaluation. Pt was able to complete all mobility without assist, no LOB. Demonstrates good strength. However, pt with significant desaturation when standing at sink, SpO2 to 64%. Pt reports asymptomatic but does demo incr in WOB/SOB. Face somewhat pale. Returned to bed and able to recover to 90% in 5 minutes. Extensive education and discussion regarding energy conservation techniques and DME for return home and effects of O2 desaturation. Concern is that pt has flight of stairs to negotiate and only tolerated 5-10 min of minimal activity today. Pt is capable of mobilizing in room with nursing. PT will continue to follow and see patient closer to DC to ensure safe return home.   "

## 2020-04-28 NOTE — CONSULTS
pulmonary Consultation    Date of consult: 4/28/2020    Referring Physician  CARRI Moore*    Reason for Consultation  Worsening hypoxemia    History of Presenting Illness  69 y.o. male who presented 4/25/2020 with  Hx of liver Ca, COPD on home nocturnal oxygen.  Admitted 4/25  He also has had myalgias and a cough  Rx unasyn and doxy  Blood cxs negative and also 4/25 COVID negative NP PCR  He is now down to 5l NC but saturating at 96%    Code Status  Full Code    Review of Systems  Review of Systems   Constitutional: Positive for malaise/fatigue.   HENT: Negative.    Eyes: Negative.    Respiratory: Positive for shortness of breath.    Cardiovascular: Negative.    Gastrointestinal: Negative.    Genitourinary: Negative.    Musculoskeletal: Negative.    Skin: Negative.    Neurological: Positive for weakness.   Endo/Heme/Allergies: Negative.    Psychiatric/Behavioral: Negative.        Past Medical History   has a past medical history of Cancer (HCC), Hepatitis C, and Liver disease.    Surgical History   has no past surgical history on file.    Family History  family history is not on file.    Social History   reports that he quit smoking about 10 years ago. His smoking use included cigarettes. He does not have any smokeless tobacco history on file.    Medications  Home Medications     Reviewed by Shila Llanes R.N. (Registered Nurse) on 04/25/20 at 2339  Med List Status: Complete   Medication Last Dose Status   aspirin EC (ECOTRIN) 81 MG Tablet Delayed Response 4/24/2020 Active   Lenvatinib Mesylate (LENVIMA, 8 MG DAILY DOSE, PO) 4/24/2020 Active   Macitentan (OPSUMIT) 10 MG Tab 4/24/2020 Active   sildenafil citrate (VIAGRA) 25 MG Tab 4/24/2020 Active   sodium chloride (OCEAN) 0.65 % Solution 4/24/2020 Active   terazosin (HYTRIN) 2 MG Cap 4/24/2020 Active   traZODone (DESYREL) 50 MG Tab 4/24/2020 Active   vitamin E (VITAMIN E) 1000 UNIT Cap 4/24/2020 Active              Current Facility-Administered  Medications   Medication Dose Route Frequency Provider Last Rate Last Dose   • sildenafil (REVATIO) tablet 60 mg  60 mg Oral TID Grazyna Conner M.D.   60 mg at 04/28/20 1811   • albuterol inhaler 2 Puff  2 Puff Inhalation Q4HRS (RT) Debby Conner M.D.   2 Puff at 04/28/20 1606   • oxyCODONE immediate-release (ROXICODONE) tablet 5 mg  5 mg Oral Q4HRS PRN Debby Conner M.D.   5 mg at 04/28/20 1815   • traZODone (DESYREL) tablet 50 mg  50 mg Oral Nightly Debby Conner M.D.   50 mg at 04/27/20 2001   • aspirin EC (ECOTRIN) tablet 81 mg  81 mg Oral DAILY Debby Conner M.D.   81 mg at 04/28/20 0535   • senna-docusate (PERICOLACE or SENOKOT S) 8.6-50 MG per tablet 2 Tab  2 Tab Oral BID Charleen Gomes M.D.   2 Tab at 04/27/20 0538    And   • polyethylene glycol/lytes (MIRALAX) PACKET 1 Packet  1 Packet Oral QDAY PRN Charleen Gomes M.D.        And   • magnesium hydroxide (MILK OF MAGNESIA) suspension 30 mL  30 mL Oral QDAY PRN Charleen Gomes M.D.        And   • bisacodyl (DULCOLAX) suppository 10 mg  10 mg Rectal QDAY PRN Charleen Gomes M.D.       • acetaminophen (TYLENOL) tablet 650 mg  650 mg Oral Q6HRS PRN Charleen Gomes M.D.   650 mg at 04/28/20 0025   • ampicillin/sulbactam (UNASYN) 3 g in  mL IVPB  3 g Intravenous Q6HRS Charleen Gomes M.D. 200 mL/hr at 04/28/20 1811 3 g at 04/28/20 1811   • guaiFENesin dextromethorphan (ROBITUSSIN DM) 100-10 MG/5ML syrup 10 mL  10 mL Oral Q6HRS PRN Charleen Gomes M.D.   10 mL at 04/27/20 2001   • Respiratory Therapy Consult   Nebulization Continuous RT Charleen Gomes M.D.       • doxycycline monohydrate (ADOXA) tablet 100 mg  100 mg Oral Q12HRS Charleen Gomes M.D.   100 mg at 04/28/20 1811       Allergies  No Known Allergies    Vital Signs last 24 hours  Temp:  [36.5 °C (97.7 °F)-37.9 °C (100.3 °F)] 37.5 °C (99.5 °F)  Pulse:  [61-96] 75  Resp:  [12-26] 19  BP: (105-149)/(56-79) 114/68  SpO2:  [88 %-95 %] 94 %    Physical Exam  Physical  Exam  Constitutional:       Appearance: He is ill-appearing.   HENT:      Head: Normocephalic and atraumatic.      Mouth/Throat:      Mouth: Mucous membranes are dry.   Eyes:      Extraocular Movements: Extraocular movements intact.      Conjunctiva/sclera: Conjunctivae normal.      Pupils: Pupils are equal, round, and reactive to light.   Cardiovascular:      Rate and Rhythm: Normal rate.   Pulmonary:      Effort: Pulmonary effort is normal.      Comments: Inspiratory crackles posterior lung fields  Abdominal:      General: Abdomen is flat.   Musculoskeletal: Normal range of motion.   Skin:     General: Skin is warm and dry.   Neurological:      General: No focal deficit present.      Mental Status: He is alert.   Psychiatric:         Mood and Affect: Mood normal.      Comments: Flat affect         Fluids    Intake/Output Summary (Last 24 hours) at 4/28/2020 1829  Last data filed at 4/28/2020 0609  Gross per 24 hour   Intake 450 ml   Output 500 ml   Net -50 ml       Laboratory  Labs:  Recent Labs     04/26/20 0010 04/27/20 0206 04/28/20 0415   WBC 4.5* 8.1 5.6   RBC 3.27* 2.86* 2.92*   HEMOGLOBIN 12.5* 11.0* 11.3*   HEMATOCRIT 35.8* 31.6* 31.9*   .5* 110.5* 109.2*   MCH 38.2* 38.5* 38.7*   MCHC 34.9 34.8 35.4*   RDW 69.7* 70.3* 69.5*   PLATELETCT 47* 52* 58*   MPV 11.7 10.6 12.0     Recent Labs     04/25/20 2040   APTT 35.5   INR 1.41*             Recent Labs     04/26/20  0010 04/27/20 0206 04/28/20  0415   SODIUM 137 133* 132*   POTASSIUM 3.7 4.2 4.4   CHLORIDE 106 105 105   CO2 16* 21 20   GLUCOSE 163* 118* 97   BUN 18 24* 20     Recent Labs     04/26/20  0010 04/27/20 0206 04/28/20  0415   SODIUM 137 133* 132*   POTASSIUM 3.7 4.2 4.4   CHLORIDE 106 105 105   CO2 16* 21 20   BUN 18 24* 20   CREATININE 0.77 0.78 0.64   CALCIUM 7.8* 7.7* 7.6*     No results found for this or any previous visit.      Imaging:   CT-CTA CHEST PULMONARY ARTERY W/ RECONS   Final Result      No central or large segmental  pulmonary embolus is identified. Examination is limited by patient motion.      Small right pleural effusion with overlying atelectasis/consolidation.      Small left pleural effusion with mild overlying atelectasis.      Ill-defined groundglass opacities in the upper lobes, left greater than right, and lingula are noted. Imaging features can be seen with COVID-19 pneumonia, though are nonspecific and can occur with a variety of infectious and noninfectious processes.      Prominence of the main pulmonary artery can be seen with pulmonary arterial hypertension.      Atherosclerotic plaque including coronary artery calcification.      Small pericardial effusion.      Emphysematous changes.      Findings of cirrhosis and portal hypertension.      Ascites.      Left renal cysts.      Cholelithiasis.      6.5 mm right middle lobe pulmonary nodule.      Low Risk: CT at 6-12 months, then consider CT at 18-24 months      High Risk: CT at 6-12 months, then CT at 18-24 months      Low Risk - Minimal or absent history of smoking and of other known risk factors.      High Risk - History of smoking or of other known risk factors.      Note: These recommendations do not apply to lung cancer screening, patients with immunosuppression, or patients with known primary cancer.      Fleischner Society 2017 Guidelines for Management of Incidentally Detected Pulmonary Nodules in Adults      DX-CHEST-PORTABLE (1 VIEW)   Final Result      1.  Central vascular and mild interstitial prominence which could represent vascular congestion. Cannot exclude interstitial pneumonitis.   2.  Possible retrocardiac left lower lobe opacity which may represent atelectasis and/or pneumonitis.   3.  CT chest may be performed for further evaluation.          Imaging  CT scan personally reviewed  No PEs  Small right pleural effusion  B/l patch GG opacities predominantly in upper lobes    Assessment/Plan  * Acute and chronic respiratory failure with hypoxia  (HCC)- (present on admission)  Assessment & Plan  With abnormal CT with patchy areas of GG  Would recommend a second covid NP test  Sent Covid lab markers - (did not sent IL6)  I think his Fio2 requirement is a lot lower as he is saturating quite high  Recommend sats 89-92%  If fio2 goes past 6 L NC recommend proning before changing to face mask  If all his inflammatory markers are elevated I.e CRP > double and covid is positive - consider steroids 1 mg/kg bid, in addition if D dimer is elevated in addition to CRP doubling or elevation of creatinine or if fio2 goes up consider anticoagulation  Will follow the labs in am.      Discussed patient with RN and Dr. Young

## 2020-04-28 NOTE — CONSULTS
Palliative Care   Stopped by patient room to attempt Palliative Care consult.  Bedside RN Lashell advised now is not a good time as they will be taking patient off floor to radiology for CT shortly.  She advised she does not anticipate patient will be discharged today.  Goal to [place] back later this afternoon or tomorrow morning to discuss Advance Care Planning.    BERTA Adler, [facility]  Palliative Care Nurse Practitioner  [phone]

## 2020-04-28 NOTE — PROGRESS NOTES
Assumed pt care at 0700. Received report from Keila BYRNE. Pt A&O x4. Pt c/o 3/10 pain at this time and declines interventions. Moderate work of breathing noted, spo2 90% on 6L. SR on monitor.  Updated on POC, communication board updated. Bed locked and in lowest position. Call light and belongings within reach. Non-skid socks in place. Needs met, will continue to monitor.

## 2020-04-28 NOTE — ASSESSMENT & PLAN NOTE
Discussed with oncology on-call over the weekend. Patient can resume home lenvatinib on discharge, once acute issues have resolved.   Palliative care consult requested.  Patient reported that he follows oncology at [facility] in [place]

## 2020-04-29 ENCOUNTER — APPOINTMENT (OUTPATIENT)
Dept: CARDIOLOGY | Facility: MEDICAL CENTER | Age: 70
DRG: 190 | End: 2020-04-29
Attending: INTERNAL MEDICINE
Payer: MEDICARE

## 2020-04-29 LAB
LV EJECT FRACT  99904: 65
SARS-COV-2 RNA RESP QL NAA+PROBE: NEGATIVE
SPECIMEN SOURCE: NORMAL

## 2020-04-29 PROCEDURE — 700102 HCHG RX REV CODE 250 W/ 637 OVERRIDE(OP): Performed by: INTERNAL MEDICINE

## 2020-04-29 PROCEDURE — A9270 NON-COVERED ITEM OR SERVICE: HCPCS | Performed by: INTERNAL MEDICINE

## 2020-04-29 PROCEDURE — 700102 HCHG RX REV CODE 250 W/ 637 OVERRIDE(OP): Performed by: HOSPITALIST

## 2020-04-29 PROCEDURE — 99497 ADVNCD CARE PLAN 30 MIN: CPT | Performed by: NURSE PRACTITIONER

## 2020-04-29 PROCEDURE — 700111 HCHG RX REV CODE 636 W/ 250 OVERRIDE (IP): Performed by: HOSPITALIST

## 2020-04-29 PROCEDURE — 93306 TTE W/DOPPLER COMPLETE: CPT | Mod: 26 | Performed by: INTERNAL MEDICINE

## 2020-04-29 PROCEDURE — A9270 NON-COVERED ITEM OR SERVICE: HCPCS | Performed by: HOSPITALIST

## 2020-04-29 PROCEDURE — 700105 HCHG RX REV CODE 258: Performed by: HOSPITALIST

## 2020-04-29 PROCEDURE — 770020 HCHG ROOM/CARE - TELE (206)

## 2020-04-29 PROCEDURE — 94669 MECHANICAL CHEST WALL OSCILL: CPT

## 2020-04-29 PROCEDURE — 99233 SBSQ HOSP IP/OBS HIGH 50: CPT | Performed by: INTERNAL MEDICINE

## 2020-04-29 PROCEDURE — 99498 ADVNCD CARE PLAN ADDL 30 MIN: CPT | Performed by: NURSE PRACTITIONER

## 2020-04-29 PROCEDURE — U0004 COV-19 TEST NON-CDC HGH THRU: HCPCS

## 2020-04-29 PROCEDURE — 93306 TTE W/DOPPLER COMPLETE: CPT

## 2020-04-29 PROCEDURE — 94640 AIRWAY INHALATION TREATMENT: CPT

## 2020-04-29 RX ORDER — ALBUTEROL SULFATE 90 UG/1
2 AEROSOL, METERED RESPIRATORY (INHALATION)
Status: DISCONTINUED | OUTPATIENT
Start: 2020-04-29 | End: 2020-05-02 | Stop reason: HOSPADM

## 2020-04-29 RX ADMIN — ALBUTEROL SULFATE 2 PUFF: 90 AEROSOL, METERED RESPIRATORY (INHALATION) at 21:28

## 2020-04-29 RX ADMIN — SILDENAFIL CITRATE 60 MG: 20 TABLET ORAL at 19:17

## 2020-04-29 RX ADMIN — AMPICILLIN SODIUM AND SULBACTAM SODIUM 3 G: 2; 1 INJECTION, POWDER, FOR SOLUTION INTRAMUSCULAR; INTRAVENOUS at 00:25

## 2020-04-29 RX ADMIN — ASPIRIN 81 MG: 81 TABLET, COATED ORAL at 06:48

## 2020-04-29 RX ADMIN — ALBUTEROL SULFATE 2 PUFF: 90 AEROSOL, METERED RESPIRATORY (INHALATION) at 00:26

## 2020-04-29 RX ADMIN — TRAZODONE HYDROCHLORIDE 50 MG: 50 TABLET ORAL at 20:31

## 2020-04-29 RX ADMIN — ALBUTEROL SULFATE 2 PUFF: 90 AEROSOL, METERED RESPIRATORY (INHALATION) at 15:56

## 2020-04-29 RX ADMIN — ALBUTEROL SULFATE 2 PUFF: 90 AEROSOL, METERED RESPIRATORY (INHALATION) at 11:16

## 2020-04-29 RX ADMIN — DOXYCYCLINE 100 MG: 100 TABLET, FILM COATED ORAL at 06:48

## 2020-04-29 RX ADMIN — SENNOSIDES AND DOCUSATE SODIUM 2 TABLET: 8.6; 5 TABLET ORAL at 06:48

## 2020-04-29 RX ADMIN — SILDENAFIL CITRATE 60 MG: 20 TABLET ORAL at 13:32

## 2020-04-29 RX ADMIN — ALBUTEROL SULFATE 2 PUFF: 90 AEROSOL, METERED RESPIRATORY (INHALATION) at 19:17

## 2020-04-29 RX ADMIN — ALBUTEROL SULFATE 2 PUFF: 90 AEROSOL, METERED RESPIRATORY (INHALATION) at 04:22

## 2020-04-29 RX ADMIN — DOXYCYCLINE 100 MG: 100 TABLET, FILM COATED ORAL at 19:17

## 2020-04-29 RX ADMIN — AMPICILLIN SODIUM AND SULBACTAM SODIUM 3 G: 2; 1 INJECTION, POWDER, FOR SOLUTION INTRAMUSCULAR; INTRAVENOUS at 06:47

## 2020-04-29 RX ADMIN — SILDENAFIL CITRATE 60 MG: 20 TABLET ORAL at 06:48

## 2020-04-29 RX ADMIN — ALBUTEROL SULFATE 2 PUFF: 90 AEROSOL, METERED RESPIRATORY (INHALATION) at 06:48

## 2020-04-29 ASSESSMENT — ENCOUNTER SYMPTOMS
FOCAL WEAKNESS: 0
WEIGHT LOSS: 0
SHORTNESS OF BREATH: 1
CHILLS: 0
DIZZINESS: 0
CONSTIPATION: 0
VOMITING: 0
FEVER: 0
SPUTUM PRODUCTION: 0
BLURRED VISION: 0
DIARRHEA: 0
NAUSEA: 0
EYE PAIN: 0
TINGLING: 0
ABDOMINAL PAIN: 0
ORTHOPNEA: 0
HEADACHES: 0
SENSORY CHANGE: 0
HALLUCINATIONS: 0
PHOTOPHOBIA: 0
MYALGIAS: 0
DOUBLE VISION: 0
BACK PAIN: 0
NECK PAIN: 0
SPEECH CHANGE: 0
COUGH: 1
TREMORS: 0
PALPITATIONS: 0

## 2020-04-29 ASSESSMENT — LIFESTYLE VARIABLES: SUBSTANCE_ABUSE: 0

## 2020-04-29 NOTE — ASSESSMENT & PLAN NOTE
With abnormal CT with patchy areas of GG  Would recommend a second covid NP test  Sent Covid lab markers - (did not sent IL6)  I think his Fio2 requirement is a lot lower as he is saturating quite high  Recommend sats 89-92%  If fio2 goes past 6 L NC recommend proning before changing to face mask  If all his inflammatory markers are elevated I.e CRP > double and covid is positive - consider steroids 1 mg/kg bid, in addition if D dimer is elevated in addition to CRP doubling or elevation of creatinine or if fio2 goes up consider anticoagulation  Will follow the labs in am.

## 2020-04-29 NOTE — CARE PLAN
Pt instructed on use of call light and pt agrees to call when needing anything. Call light and personal belongings within reach of pt. Pt denies any needs at this time. Will continue to monitor.

## 2020-04-29 NOTE — CARE PLAN
Problem: Bronchoconstriction:  Goal: Improve in air movement and diminished wheezing  Note: Albuterol MDI QID

## 2020-04-29 NOTE — CONSULTS
"Reason for Palliative Care Consult: Advance Care Planning    Consulted by: Porfirio Young MD    HPI: Virgilio Loza is a 69 y.o. male who presented 4/25/2020 with worsening shortness of breath.  He has a past medical history of liver cancer and COPD, on nocturnal and PRN oxygen at baseline (2.5L HS, PRN).  About 1 month prior to admission, he developed shortness of breath above his usual baseline.  Along with this, he has had a productive cough of scant clear sputum, subjective fevers, body aches, rib pain with coughing, wheezing, as well as occasional nausea and vomiting.  Patient states he uses home inhalers and also tried to use his home oxygen, with no improvement in his symptoms.  He takes chemotherapy outpatient (levatinib) for his liver cancer.  He states that he has been quarantined at home for over a month.  COVID-19 negative NP PCR X2, blood cultures negative.  Continues to experience shortness of breath and increased oxygen requirements (currently on 6L NC)    Past medical/surgical history: Acute-on-chronic respiratory failure with hypoxia, CAP, trans-aminitis, elevated troponin, thrombocytopenia, COPD, Pulmonary hypertension, liver cancer.    Additional consults:   Pulmonary    Assessment:  Neuro: Patient alert & oriented X4  Dyspnea:  -  Yes 92% on 6L FM  Last BM: 04/24/20-    Pain:  -  Lower back pain with coughing  Depression:  -  Appropriate for situation  Dementia:  No;       Living situation & psychosocial: Patient is a retired .  He is  and lives with his 38-year-old son Claude and Claude's girlfriend in [place], [place].    Spiritual:  Is Faith or spirituality important for coping with this illness? No- Declined  Has a  or spiritual provider visit been requested? No    Palliative Performance Scale:  40%    Advance Directive: None (Pt reports he completed AD \"at [facility]\") -     DPOA: No (Pt reports he would like his son Claude to be his DPOA-HC) -    POLST: No-  " "    Code Status: Full -      Outcome:  Met with patient at bedside. Introduced myself and explained the role of palliative care. From a functional standpoint patient reports that prior to admission he was \"fine,\" reporting that he is independent with all ADL's including showering, driving, ambulating, and meal preparation.  He reports he is on oxygen \"just when I need it.\"    Virgilio derives aaron from spending time with his 5 dogs (Canadian Leonardo, Karelian, \"brown dog\", Pointer, and a \"mutt\").  He loves living \"[place] from the city\" and lives on [place] acres in [place].  He said, \"I'm not a fan of humans.\"    When queried about his understanding of his current health situation, Virgilio said, \"I couldn't breathe.  Originally they were talking about the virus and tested me twice, but it was negative.  If I cough and bring stuff up, it's not good.\"  He reports having back pain with deep coughing.    Discussed goals of care.  Virgilio said, \"I want to get better and go home.\"  He verbalized his desire to continue pursuing selective treatment for his liver cancer, including oral chemotherapy, Levantinib.  Briefly discussed Hospice.  Virgilio shared he is not interested in pursuing hospice at this time, but he will consider it when the time is right stating, \"That will be my choice.\"  Again, declining hospice at this time.    Discussed Advance Directive and educated patient about purpose of form.  He reports he has already completed form stating, \"I have it with [place] in [place].\"  He shared son Claude Loza ([phone]) is listed as his medical decision maker.      Discussed code status and educated patient about term.  Described measures employed in a full code including defibrillation, CPR, and intubation.  Patient affirmed he wishes to remain Full Code at this time and said, \"I want them to do everything.\"     I expressed appreciation to patient for taking the time to speak with me today.    Provided therapeutic " "communication including open-ended questions, therapeutic silence, reflective listening, empathic support, and therapeutic touch throughout encounter. Provided business card with palliative care contact information and encouraged Virgilio to call with any questions or needs.     Plan: S/W Aracely at [facility] Medical Records ([phone]).  She confirmed patient has AD on their system dated 1/12/2010 under Letha MRN [id number].  I emailed request for copy of AD to Letha @ [email or web address].  Pending response.  Palliative care to continue to follow, provide support, and help facilitate decision making as clinical picture evolves.    16:50:  Received emailed copy of [place] Advance Health Care Directive signed 1/8/2010 listing Primary Agent as María Elena Garnett ([phone]; [phone]); and 1st Alternate Agent as Claude Loza ([phone]).    Under health care choices IV (p. 2/3) patient selected box #1 \"My life is only worth living if I can:  * communicate in some way with my loved ones  * be free from pain  * live without being hooked up to machines  Under health care choices V (p. 2/3) patient indicated, \"If I am so sick that I may die soon:  I have already decided that I do not want to have the following treatments, even if it means that I may die by not having them:  * I want no breathing machine  * I want no dialysis  * I want no artificial feeding and hydration  * I want no medicines of any kind unless they are provided for my comfort    Scanned AD into EMR.    Recommendations: I do not recommend an ethics or hospice consult at this time because patient wishes to continue pursuing selective treatment at this time including oral chemotherapy.    Updated: Bedside RN Jerome Vanessa in-person    Thank you for allowing Palliative Care to participate in Virgilio's care. Please call our team with questions and/or additional needs.    As appropriate, Palliative Care encourages medical team to engage in further " conversation, education for patient/family at bedside regarding code status, GOC/POC.    Total visit time was 47 minutes discussing and/or coordinating advance care planning.     BERTA Adler, [facility]  Palliative Care Nurse Practitioner  [phone]

## 2020-04-29 NOTE — PROGRESS NOTES
[facility] Medicine Daily Progress Note    Date of Service  4/29/2020    Chief Complaint  69 y.o. male admitted 4/25/2020 with flu like symptoms and SOB.     Interval Problem Update    I evaluated and examined him at the bedside.  He continued to have shortness of breath and is requiring 6 L of oxygen to maintain saturation.  CTA did not show pulmonary embolism.  Cardiogram shows ejection fraction of 65%.  I discussed with pulmonologist Dr. Spangler cardiovascular medical condition.  I updated patient's son over phone.      Consultants/Specialty  None    Code Status  Full    Disposition  To be decided    Review of Systems  Review of Systems   Constitutional: Positive for malaise/fatigue. Negative for chills, fever and weight loss.   HENT: Negative for hearing loss and tinnitus.    Eyes: Negative for blurred vision, double vision, photophobia and pain.   Respiratory: Positive for cough and shortness of breath. Negative for sputum production.    Cardiovascular: Negative for chest pain, palpitations, orthopnea and leg swelling.   Gastrointestinal: Negative for abdominal pain, constipation, diarrhea, nausea and vomiting.   Genitourinary: Negative for dysuria, frequency and urgency.   Musculoskeletal: Negative for back pain, joint pain, myalgias and neck pain.   Skin: Negative for rash.   Neurological: Negative for dizziness, tingling, tremors, sensory change, speech change, focal weakness and headaches.   Psychiatric/Behavioral: Negative for hallucinations and substance abuse.   All other systems reviewed and are negative.       Physical Exam  Temp:  [36.8 °C (98.3 °F)-37.6 °C (99.7 °F)] 37.6 °C (99.7 °F)  Pulse:  [65-79] 75  Resp:  [14-20] 20  BP: ()/(55-80) 104/65  SpO2:  [91 %-95 %] 94 %    Physical Exam  Vitals signs reviewed.   Constitutional:       General: He is not in acute distress.  HENT:      Head: Normocephalic and atraumatic. No contusion.      Right Ear: External ear normal.      Left Ear: External ear  normal.      Nose: Nose normal.      Mouth/Throat:      Mouth: Mucous membranes are dry.      Pharynx: No oropharyngeal exudate.      Comments: Oxygen mask present  Eyes:      General:         Right eye: No discharge.         Left eye: No discharge.      Pupils: Pupils are equal, round, and reactive to light.   Neck:      Musculoskeletal: No neck rigidity or muscular tenderness.   Cardiovascular:      Rate and Rhythm: Normal rate and regular rhythm.      Heart sounds: No murmur. No friction rub. No gallop.    Pulmonary:      Effort: Pulmonary effort is normal.      Breath sounds: No wheezing or rhonchi.      Comments: Decreased breath sounds bilateral  Abdominal:      General: Bowel sounds are normal. There is no distension.      Palpations: Abdomen is soft.      Tenderness: There is no abdominal tenderness. There is no rebound.   Musculoskeletal: Normal range of motion.         General: No swelling or tenderness.   Skin:     General: Skin is warm and dry.      Coloration: Skin is not jaundiced.   Neurological:      General: No focal deficit present.      Mental Status: He is alert.      Cranial Nerves: No cranial nerve deficit.      Sensory: No sensory deficit.   Psychiatric:         Mood and Affect: Mood normal.     I performed physical exam on April 29, 2020 and it remains similar without any acute changes.  Fluids    Intake/Output Summary (Last 24 hours) at 4/29/2020 1458  Last data filed at 4/29/2020 1340  Gross per 24 hour   Intake --   Output 1075 ml   Net -1075 ml       Laboratory  Recent Labs     04/27/20  0206 04/28/20  0415 04/28/20  1900   WBC 8.1 5.6 5.6   RBC 2.86* 2.92* 3.15*   HEMOGLOBIN 11.0* 11.3* 12.1*   HEMATOCRIT 31.6* 31.9* 34.5*   .5* 109.2* 109.5*   MCH 38.5* 38.7* 38.4*   MCHC 34.8 35.4* 35.1   RDW 70.3* 69.5* 69.9*   PLATELETCT 52* 58* 52*   MPV 10.6 12.0 10.3     Recent Labs     04/27/20  0206 04/28/20  0415   SODIUM 133* 132*   POTASSIUM 4.2 4.4   CHLORIDE 105 105   CO2 21 20    GLUCOSE 118* 97   BUN 24* 20   CREATININE 0.78 0.64   CALCIUM 7.7* 7.6*                   Imaging  EC-ECHOCARDIOGRAM COMPLETE W/O CONT   Final Result      CT-CTA CHEST PULMONARY ARTERY W/ RECONS   Final Result      No central or large segmental pulmonary embolus is identified. Examination is limited by patient motion.      Small right pleural effusion with overlying atelectasis/consolidation.      Small left pleural effusion with mild overlying atelectasis.      Ill-defined groundglass opacities in the upper lobes, left greater than right, and lingula are noted. Imaging features can be seen with COVID-19 pneumonia, though are nonspecific and can occur with a variety of infectious and noninfectious processes.      Prominence of the main pulmonary artery can be seen with pulmonary arterial hypertension.      Atherosclerotic plaque including coronary artery calcification.      Small pericardial effusion.      Emphysematous changes.      Findings of cirrhosis and portal hypertension.      Ascites.      Left renal cysts.      Cholelithiasis.      6.5 mm right middle lobe pulmonary nodule.      Low Risk: CT at 6-12 months, then consider CT at 18-24 months      High Risk: CT at 6-12 months, then CT at 18-24 months      Low Risk - Minimal or absent history of smoking and of other known risk factors.      High Risk - History of smoking or of other known risk factors.      Note: These recommendations do not apply to lung cancer screening, patients with immunosuppression, or patients with known primary cancer.      Fleischner Society 2017 Guidelines for Management of Incidentally Detected Pulmonary Nodules in Adults      DX-CHEST-PORTABLE (1 VIEW)   Final Result      1.  Central vascular and mild interstitial prominence which could represent vascular congestion. Cannot exclude interstitial pneumonitis.   2.  Possible retrocardiac left lower lobe opacity which may represent atelectasis and/or pneumonitis.   3.  CT chest may  be performed for further evaluation.           Assessment/Plan  * Acute and chronic respiratory failure with hypoxia (HCC)- (present on admission)  Assessment & Plan  Secondary to COPD exacerbation, CAP? COVID negative. Chest x-ray shows possible pneumonitis versus pneumonia.  Oxygen requirement still elevated.  Incentive spirometry.  RT protocol.  Due to his ongoing hypoxia and shortness of breath and history of cancer I ordered CTA pulmonary to evaluate for pulmonary embolism and did not show pulmonary embolism.  Echocardiogram did not show any acute abnormalities   I requested consult with pulmonology and discussed his current condition with Dr. Spangler discussed finding of CT scan as well as with echocardiogram.  I updated patient's son over phone.      Liver cancer (HCC)  Assessment & Plan  Discussed with oncology on-call over the weekend. Patient can resume home lenvatinib on discharge, once acute issues have resolved.   Palliative care consult requested.  Patient reported that he follows oncology at [facility] in [place]    Pulmonary hypertension (HCC)  Assessment & Plan  Continue outpatient sildenafil.    COPD (chronic obstructive pulmonary disease) (HCC)- (present on admission)  Assessment & Plan  Denies wheezing or recent exacerbations.   Continue to monitor.    Thrombocytopenia (HCC)- (present on admission)  Assessment & Plan  History of liver cancer, unknown baseline. Stable ~50. No evidence of active bleeding  Continue to monitor.  Hold anticoagulation.    Elevated troponin  Assessment & Plan  Now resolved.  No chest pain  Continue to monitor.    Transaminitis- (present on admission)  Assessment & Plan  Mildly elevated on admission and continuing to improve. In the setting of liver cancer.  He denies any symptoms of abdominal pain.  Continue outpatient follow-up with oncology    CAP (community acquired pneumonia)- (present on admission)  Assessment & Plan  Ruled out. Procal negative. Pneumonitis? Treatment  as noted above.  I discussed finding of CT scan with patient.  Pulmonology consult appreciate recommendations.    SIRS (systemic inflammatory response syndrome) (HCC)- (present on admission)  Assessment & Plan  SIRS criteria identified on admission:  Tachypnea, with respirations greater than 20 per minute. Secondary to dehydration on admission. Procal low and denies any symptoms.   He remains afebrile.  Continue to monitor.     I personally discussed case with Dr. Spangler over phone regarding his current medical condition and discussed finding of CT scan and echocardiogram.    I updated patient's sons over phone regarding his current medical condition.    Time spend: 34 minutes. > 50 % time spend providing counseling / co ordination of care.        VTE prophylaxis: SCDs

## 2020-04-30 LAB
ALBUMIN SERPL BCP-MCNC: 2 G/DL (ref 3.2–4.9)
ALBUMIN/GLOB SERPL: 0.7 G/DL
ALP SERPL-CCNC: 154 U/L (ref 30–99)
ALT SERPL-CCNC: 50 U/L (ref 2–50)
ANION GAP SERPL CALC-SCNC: 7 MMOL/L (ref 7–16)
AST SERPL-CCNC: 67 U/L (ref 12–45)
BACTERIA BLD CULT: NORMAL
BILIRUB SERPL-MCNC: 2.2 MG/DL (ref 0.1–1.5)
BUN SERPL-MCNC: 13 MG/DL (ref 8–22)
CALCIUM SERPL-MCNC: 7.4 MG/DL (ref 8.5–10.5)
CHLORIDE SERPL-SCNC: 105 MMOL/L (ref 96–112)
CO2 SERPL-SCNC: 23 MMOL/L (ref 20–33)
CREAT SERPL-MCNC: 0.62 MG/DL (ref 0.5–1.4)
ERYTHROCYTE [DISTWIDTH] IN BLOOD BY AUTOMATED COUNT: 67.8 FL (ref 35.9–50)
GLOBULIN SER CALC-MCNC: 2.9 G/DL (ref 1.9–3.5)
GLUCOSE SERPL-MCNC: 96 MG/DL (ref 65–99)
HCT VFR BLD AUTO: 32.2 % (ref 42–52)
HGB BLD-MCNC: 11.4 G/DL (ref 14–18)
MAGNESIUM SERPL-MCNC: 1.8 MG/DL (ref 1.5–2.5)
MCH RBC QN AUTO: 38.6 PG (ref 27–33)
MCHC RBC AUTO-ENTMCNC: 35.4 G/DL (ref 33.7–35.3)
MCV RBC AUTO: 109.2 FL (ref 81.4–97.8)
PLATELET # BLD AUTO: 57 K/UL (ref 164–446)
PMV BLD AUTO: 11.2 FL (ref 9–12.9)
POTASSIUM SERPL-SCNC: 3.7 MMOL/L (ref 3.6–5.5)
PROT SERPL-MCNC: 4.9 G/DL (ref 6–8.2)
RBC # BLD AUTO: 2.95 M/UL (ref 4.7–6.1)
SIGNIFICANT IND 70042: NORMAL
SITE SITE: NORMAL
SODIUM SERPL-SCNC: 135 MMOL/L (ref 135–145)
SOURCE SOURCE: NORMAL
WBC # BLD AUTO: 5 K/UL (ref 4.8–10.8)

## 2020-04-30 PROCEDURE — 700102 HCHG RX REV CODE 250 W/ 637 OVERRIDE(OP): Performed by: HOSPITALIST

## 2020-04-30 PROCEDURE — 770020 HCHG ROOM/CARE - TELE (206)

## 2020-04-30 PROCEDURE — 85027 COMPLETE CBC AUTOMATED: CPT

## 2020-04-30 PROCEDURE — 83735 ASSAY OF MAGNESIUM: CPT

## 2020-04-30 PROCEDURE — A9270 NON-COVERED ITEM OR SERVICE: HCPCS | Performed by: INTERNAL MEDICINE

## 2020-04-30 PROCEDURE — 700102 HCHG RX REV CODE 250 W/ 637 OVERRIDE(OP): Performed by: INTERNAL MEDICINE

## 2020-04-30 PROCEDURE — A9270 NON-COVERED ITEM OR SERVICE: HCPCS | Performed by: HOSPITALIST

## 2020-04-30 PROCEDURE — 80053 COMPREHEN METABOLIC PANEL: CPT

## 2020-04-30 PROCEDURE — 99232 SBSQ HOSP IP/OBS MODERATE 35: CPT | Performed by: INTERNAL MEDICINE

## 2020-04-30 RX ADMIN — ALBUTEROL SULFATE 2 PUFF: 90 AEROSOL, METERED RESPIRATORY (INHALATION) at 06:01

## 2020-04-30 RX ADMIN — ALBUTEROL SULFATE 2 PUFF: 90 AEROSOL, METERED RESPIRATORY (INHALATION) at 16:47

## 2020-04-30 RX ADMIN — ALBUTEROL SULFATE 2 PUFF: 90 AEROSOL, METERED RESPIRATORY (INHALATION) at 18:00

## 2020-04-30 RX ADMIN — TRAZODONE HYDROCHLORIDE 50 MG: 50 TABLET ORAL at 22:49

## 2020-04-30 RX ADMIN — DOXYCYCLINE 100 MG: 100 TABLET, FILM COATED ORAL at 06:06

## 2020-04-30 RX ADMIN — SENNOSIDES AND DOCUSATE SODIUM 2 TABLET: 8.6; 5 TABLET ORAL at 06:03

## 2020-04-30 RX ADMIN — DOXYCYCLINE 100 MG: 100 TABLET, FILM COATED ORAL at 17:59

## 2020-04-30 RX ADMIN — ALBUTEROL SULFATE 2 PUFF: 90 AEROSOL, METERED RESPIRATORY (INHALATION) at 12:20

## 2020-04-30 RX ADMIN — SILDENAFIL CITRATE 60 MG: 20 TABLET ORAL at 17:59

## 2020-04-30 RX ADMIN — ASPIRIN 81 MG: 81 TABLET, COATED ORAL at 06:03

## 2020-04-30 RX ADMIN — SILDENAFIL CITRATE 60 MG: 20 TABLET ORAL at 12:20

## 2020-04-30 RX ADMIN — SILDENAFIL CITRATE 60 MG: 20 TABLET ORAL at 06:02

## 2020-04-30 RX ADMIN — POLYETHYLENE GLYCOL 3350 1 PACKET: 17 POWDER, FOR SOLUTION ORAL at 06:03

## 2020-04-30 ASSESSMENT — ENCOUNTER SYMPTOMS
MYALGIAS: 0
CHILLS: 0
VOMITING: 0
SHORTNESS OF BREATH: 1
CONSTIPATION: 0
NAUSEA: 0
BACK PAIN: 0
SENSORY CHANGE: 0
EYE PAIN: 0
COUGH: 1
ABDOMINAL PAIN: 0
FEVER: 0
NECK PAIN: 0
PHOTOPHOBIA: 0
TINGLING: 0
WEIGHT LOSS: 0
HEADACHES: 0
SPEECH CHANGE: 0
BLURRED VISION: 0
SPUTUM PRODUCTION: 0
TREMORS: 0
DIZZINESS: 0
PALPITATIONS: 0
ORTHOPNEA: 0
FOCAL WEAKNESS: 0
DIARRHEA: 0
DOUBLE VISION: 0
HALLUCINATIONS: 0

## 2020-04-30 ASSESSMENT — LIFESTYLE VARIABLES: SUBSTANCE_ABUSE: 0

## 2020-04-30 NOTE — RESPIRATORY CARE
COPD EDUCATION by COPD CLINICAL EDUCATOR  4/30/2020  at  9:20 AM by Jodi West, RRT     Patient interviewed by COPD education team on 4/29/2020 by Joy Casiano. Short intervention has been conducted.  A comprehensive packet including information about COPD, treatments, and smoking cessation given.

## 2020-04-30 NOTE — CARE PLAN
Problem: Infection  Goal: Will remain free from infection  Outcome: PROGRESSING AS EXPECTED     Problem: Respiratory:  Goal: Respiratory status will improve  Outcome: PROGRESSING AS EXPECTED     Problem: Communication  Goal: The ability to communicate needs accurately and effectively will improve  Outcome: PROGRESSING AS EXPECTED     Problem: Safety  Goal: Will remain free from injury  Outcome: PROGRESSING AS EXPECTED  Goal: Will remain free from falls  Outcome: PROGRESSING AS EXPECTED     Problem: Pain Management  Goal: Pain level will decrease to patient's comfort goal  Outcome: PROGRESSING AS EXPECTED     Problem: Venous Thromboembolism (VTW)/Deep Vein Thrombosis (DVT) Prevention:  Goal: Patient will participate in Venous Thrombosis (VTE)/Deep Vein Thrombosis (DVT)Prevention Measures  Outcome: PROGRESSING AS EXPECTED     Problem: Psychosocial Needs:  Goal: Level of anxiety will decrease  Outcome: PROGRESSING AS EXPECTED     Problem: Fluid Volume:  Goal: Will maintain balanced intake and output  Outcome: PROGRESSING AS EXPECTED     Problem: Bowel/Gastric:  Goal: Normal bowel function is maintained or improved  Outcome: PROGRESSING AS EXPECTED  Goal: Will not experience complications related to bowel motility  Outcome: PROGRESSING AS EXPECTED     Problem: Urinary Elimination:  Goal: Ability to reestablish a normal urinary elimination pattern will improve  Outcome: PROGRESSING AS EXPECTED     Problem: Skin Integrity  Goal: Risk for impaired skin integrity will decrease  Outcome: PROGRESSING AS EXPECTED     Problem: Mobility  Goal: Risk for activity intolerance will decrease  Outcome: PROGRESSING AS EXPECTED     Problem: Medication  Goal: Compliance with prescribed medication will improve  Outcome: PROGRESSING AS EXPECTED     Problem: Knowledge Deficit  Goal: Knowledge of disease process/condition, treatment plan, diagnostic tests, and medications will improve  Outcome: PROGRESSING AS EXPECTED  Goal: Knowledge of  the prescribed therapeutic regimen will improve  Outcome: PROGRESSING AS EXPECTED     Problem: Discharge Barriers/Planning  Goal: Patient's continuum of care needs will be met  Outcome: PROGRESSING AS EXPECTED

## 2020-04-30 NOTE — PROGRESS NOTES
[facility] Medicine Daily Progress Note    Date of Service  4/30/2020    Chief Complaint  69 y.o. male admitted 4/25/2020 with flu like symptoms and SOB.     Interval Problem Update    I evaluated and examined him at the bedside.  He continued to have shortness of breath and is requiring 6 L of oxygen to maintain saturation.  CTA did not show pulmonary embolism.  Cardiogram shows ejection fraction of 65%.  I discussed with pulmonologist Dr. Spangler cardiovascular medical condition.  I updated patient's son over phone.      Consultants/Specialty  None    Code Status  Full    Disposition  To be decided    Review of Systems  Review of Systems   Constitutional: Positive for malaise/fatigue. Negative for chills, fever and weight loss.   HENT: Negative for hearing loss and tinnitus.    Eyes: Negative for blurred vision, double vision, photophobia and pain.   Respiratory: Positive for cough and shortness of breath. Negative for sputum production.    Cardiovascular: Negative for chest pain, palpitations, orthopnea and leg swelling.   Gastrointestinal: Negative for abdominal pain, constipation, diarrhea, nausea and vomiting.   Genitourinary: Negative for dysuria, frequency and urgency.   Musculoskeletal: Negative for back pain, joint pain, myalgias and neck pain.   Skin: Negative for rash.   Neurological: Negative for dizziness, tingling, tremors, sensory change, speech change, focal weakness and headaches.   Psychiatric/Behavioral: Negative for hallucinations and substance abuse.   All other systems reviewed and are negative.       Physical Exam  Temp:  [37.1 °C (98.7 °F)-37.7 °C (99.9 °F)] 37.1 °C (98.7 °F)  Pulse:  [70-81] 74  Resp:  [18-22] 22  BP: ()/(57-74) 126/74  SpO2:  [90 %-94 %] 90 %    Physical Exam  Vitals signs reviewed.   Constitutional:       General: He is not in acute distress.  HENT:      Head: Normocephalic and atraumatic. No contusion.      Right Ear: External ear normal.      Left Ear: External ear  normal.      Nose: Nose normal.      Mouth/Throat:      Mouth: Mucous membranes are dry.      Pharynx: No oropharyngeal exudate.      Comments: Oxygen mask present  Eyes:      General:         Right eye: No discharge.         Left eye: No discharge.      Pupils: Pupils are equal, round, and reactive to light.   Neck:      Musculoskeletal: No neck rigidity or muscular tenderness.   Cardiovascular:      Rate and Rhythm: Normal rate and regular rhythm.      Heart sounds: No murmur. No friction rub. No gallop.    Pulmonary:      Effort: Pulmonary effort is normal.      Breath sounds: No wheezing or rhonchi.      Comments: Decreased breath sounds bilateral  Abdominal:      General: Bowel sounds are normal. There is no distension.      Palpations: Abdomen is soft.      Tenderness: There is no abdominal tenderness. There is no rebound.   Musculoskeletal: Normal range of motion.         General: No swelling or tenderness.   Skin:     General: Skin is warm and dry.      Coloration: Skin is not jaundiced.   Neurological:      General: No focal deficit present.      Mental Status: He is alert.      Cranial Nerves: No cranial nerve deficit.      Sensory: No sensory deficit.   Psychiatric:         Mood and Affect: Mood normal.     I performed physical exam on April 30, 2020 and it remains similar without any acute changes.  Fluids    Intake/Output Summary (Last 24 hours) at 4/30/2020 1610  Last data filed at 4/30/2020 0600  Gross per 24 hour   Intake --   Output 800 ml   Net -800 ml       Laboratory  Recent Labs     04/28/20  0415 04/28/20  1900 04/30/20  0423   WBC 5.6 5.6 5.0   RBC 2.92* 3.15* 2.95*   HEMOGLOBIN 11.3* 12.1* 11.4*   HEMATOCRIT 31.9* 34.5* 32.2*   .2* 109.5* 109.2*   MCH 38.7* 38.4* 38.6*   MCHC 35.4* 35.1 35.4*   RDW 69.5* 69.9* 67.8*   PLATELETCT 58* 52* 57*   MPV 12.0 10.3 11.2     Recent Labs     04/28/20  0415 04/30/20  0423   SODIUM 132* 135   POTASSIUM 4.4 3.7   CHLORIDE 105 105   CO2 20 23    GLUCOSE 97 96   BUN 20 13   CREATININE 0.64 0.62   CALCIUM 7.6* 7.4*                   Imaging  EC-ECHOCARDIOGRAM COMPLETE W/O CONT   Final Result      CT-CTA CHEST PULMONARY ARTERY W/ RECONS   Final Result      No central or large segmental pulmonary embolus is identified. Examination is limited by patient motion.      Small right pleural effusion with overlying atelectasis/consolidation.      Small left pleural effusion with mild overlying atelectasis.      Ill-defined groundglass opacities in the upper lobes, left greater than right, and lingula are noted. Imaging features can be seen with COVID-19 pneumonia, though are nonspecific and can occur with a variety of infectious and noninfectious processes.      Prominence of the main pulmonary artery can be seen with pulmonary arterial hypertension.      Atherosclerotic plaque including coronary artery calcification.      Small pericardial effusion.      Emphysematous changes.      Findings of cirrhosis and portal hypertension.      Ascites.      Left renal cysts.      Cholelithiasis.      6.5 mm right middle lobe pulmonary nodule.      Low Risk: CT at 6-12 months, then consider CT at 18-24 months      High Risk: CT at 6-12 months, then CT at 18-24 months      Low Risk - Minimal or absent history of smoking and of other known risk factors.      High Risk - History of smoking or of other known risk factors.      Note: These recommendations do not apply to lung cancer screening, patients with immunosuppression, or patients with known primary cancer.      Fleischner Society 2017 Guidelines for Management of Incidentally Detected Pulmonary Nodules in Adults      DX-CHEST-PORTABLE (1 VIEW)   Final Result      1.  Central vascular and mild interstitial prominence which could represent vascular congestion. Cannot exclude interstitial pneumonitis.   2.  Possible retrocardiac left lower lobe opacity which may represent atelectasis and/or pneumonitis.   3.  CT chest may be  performed for further evaluation.           Assessment/Plan  * Acute and chronic respiratory failure with hypoxia (HCC)- (present on admission)  Assessment & Plan  Secondary to COPD exacerbation, CAP? COVID negative. Chest x-ray shows possible pneumonitis versus pneumonia.  Oxygen requirement still elevated.  Incentive spirometry.  RT protocol.  Due to his ongoing hypoxia and shortness of breath and history of cancer I ordered CTA pulmonary to evaluate for pulmonary embolism and did not show pulmonary embolism.  Echocardiogram did not show any acute abnormalities   I requested consult with pulmonology and discussed his current condition with Dr. Spangler discussed finding of CT scan as well as with echocardiogram.  I updated patient's son over phone.      Liver cancer (HCC)  Assessment & Plan  Discussed with oncology on-call over the weekend. Patient can resume home lenvatinib on discharge, once acute issues have resolved.   Palliative care consult requested.  Patient reported that he follows oncology at [facility] in [place]    Pulmonary hypertension (HCC)  Assessment & Plan  Continue outpatient sildenafil.    COPD (chronic obstructive pulmonary disease) (HCC)- (present on admission)  Assessment & Plan  Denies wheezing or recent exacerbations.   Continue to monitor.    Thrombocytopenia (HCC)- (present on admission)  Assessment & Plan  History of liver cancer, unknown baseline. Stable ~50. No evidence of active bleeding  Continue to monitor.  Hold anticoagulation.    Elevated troponin  Assessment & Plan  Now resolved.  No chest pain  Continue to monitor.    Transaminitis- (present on admission)  Assessment & Plan  Mildly elevated on admission and continuing to improve. In the setting of liver cancer.  He denies any symptoms of abdominal pain.  Continue outpatient follow-up with oncology    CAP (community acquired pneumonia)- (present on admission)  Assessment & Plan  Ruled out. Procal negative. Pneumonitis? Treatment as  noted above.  I discussed finding of CT scan with patient.  Pulmonology consult appreciate recommendations.    SIRS (systemic inflammatory response syndrome) (HCC)- (present on admission)  Assessment & Plan  SIRS criteria identified on admission:  Tachypnea, with respirations greater than 20 per minute. Secondary to dehydration on admission. Procal low and denies any symptoms.   He remains afebrile.  Continue to monitor.    Encouraged him for mobilization.  He continues requiring 6 L of oxygen to maintain oxygen saturation.  I discussed plan of care with him.  I discussed plan of care with pulmonology.     I have seen and examined patient on 4/30/2020. I have reviewed vitals, new labs and imaging. I have discussed POC with RN. There are no changes from (4/29/2020) except for what is mentioned above.         VTE prophylaxis: SCDs

## 2020-04-30 NOTE — PROGRESS NOTES
Assessment completed. Pt A&Ox4. Mild/moderate breathing effort noted, sating 91% on 6L oximask. Encouraged IS use, pulling 2000. Productive cough. Pt denies pain at this time. Monitors applied, call light and belongings within reach. POC updated (RT, titrate o2). Pt educated on room and call light, pt verbalized understanding. Communication board updated. Needs met.

## 2020-05-01 LAB
ANION GAP SERPL CALC-SCNC: 9 MMOL/L (ref 7–16)
BACTERIA BLD CULT: NORMAL
BUN SERPL-MCNC: 12 MG/DL (ref 8–22)
CALCIUM SERPL-MCNC: 7.4 MG/DL (ref 8.5–10.5)
CHLORIDE SERPL-SCNC: 106 MMOL/L (ref 96–112)
CO2 SERPL-SCNC: 21 MMOL/L (ref 20–33)
CREAT SERPL-MCNC: 0.62 MG/DL (ref 0.5–1.4)
ERYTHROCYTE [DISTWIDTH] IN BLOOD BY AUTOMATED COUNT: 69 FL (ref 35.9–50)
GLUCOSE SERPL-MCNC: 85 MG/DL (ref 65–99)
HCT VFR BLD AUTO: 32.8 % (ref 42–52)
HGB BLD-MCNC: 11.5 G/DL (ref 14–18)
MAGNESIUM SERPL-MCNC: 1.8 MG/DL (ref 1.5–2.5)
MCH RBC QN AUTO: 39 PG (ref 27–33)
MCHC RBC AUTO-ENTMCNC: 35.1 G/DL (ref 33.7–35.3)
MCV RBC AUTO: 111.2 FL (ref 81.4–97.8)
PLATELET # BLD AUTO: 60 K/UL (ref 164–446)
PMV BLD AUTO: 10.6 FL (ref 9–12.9)
POTASSIUM SERPL-SCNC: 3.7 MMOL/L (ref 3.6–5.5)
RBC # BLD AUTO: 2.95 M/UL (ref 4.7–6.1)
SIGNIFICANT IND 70042: NORMAL
SITE SITE: NORMAL
SODIUM SERPL-SCNC: 136 MMOL/L (ref 135–145)
SOURCE SOURCE: NORMAL
WBC # BLD AUTO: 6 K/UL (ref 4.8–10.8)

## 2020-05-01 PROCEDURE — 700102 HCHG RX REV CODE 250 W/ 637 OVERRIDE(OP): Performed by: INTERNAL MEDICINE

## 2020-05-01 PROCEDURE — A9270 NON-COVERED ITEM OR SERVICE: HCPCS | Performed by: INTERNAL MEDICINE

## 2020-05-01 PROCEDURE — 700102 HCHG RX REV CODE 250 W/ 637 OVERRIDE(OP): Performed by: HOSPITALIST

## 2020-05-01 PROCEDURE — 99232 SBSQ HOSP IP/OBS MODERATE 35: CPT | Performed by: INTERNAL MEDICINE

## 2020-05-01 PROCEDURE — A9270 NON-COVERED ITEM OR SERVICE: HCPCS | Performed by: HOSPITALIST

## 2020-05-01 PROCEDURE — 80048 BASIC METABOLIC PNL TOTAL CA: CPT

## 2020-05-01 PROCEDURE — 85027 COMPLETE CBC AUTOMATED: CPT

## 2020-05-01 PROCEDURE — 94640 AIRWAY INHALATION TREATMENT: CPT

## 2020-05-01 PROCEDURE — 770020 HCHG ROOM/CARE - TELE (206)

## 2020-05-01 PROCEDURE — 83735 ASSAY OF MAGNESIUM: CPT

## 2020-05-01 RX ADMIN — ALBUTEROL SULFATE 2 PUFF: 90 AEROSOL, METERED RESPIRATORY (INHALATION) at 19:56

## 2020-05-01 RX ADMIN — GUAIFENESIN AND DEXTROMETHORPHAN 10 ML: 100; 10 SYRUP ORAL at 20:53

## 2020-05-01 RX ADMIN — SILDENAFIL CITRATE 60 MG: 20 TABLET ORAL at 18:07

## 2020-05-01 RX ADMIN — ASPIRIN 81 MG: 81 TABLET, COATED ORAL at 05:44

## 2020-05-01 RX ADMIN — TRAZODONE HYDROCHLORIDE 50 MG: 50 TABLET ORAL at 20:53

## 2020-05-01 RX ADMIN — SENNOSIDES AND DOCUSATE SODIUM 2 TABLET: 8.6; 5 TABLET ORAL at 05:44

## 2020-05-01 RX ADMIN — ALBUTEROL SULFATE 2 PUFF: 90 AEROSOL, METERED RESPIRATORY (INHALATION) at 05:56

## 2020-05-01 RX ADMIN — SILDENAFIL CITRATE 60 MG: 20 TABLET ORAL at 12:39

## 2020-05-01 RX ADMIN — SILDENAFIL CITRATE 60 MG: 20 TABLET ORAL at 05:44

## 2020-05-01 RX ADMIN — ALBUTEROL SULFATE 2 PUFF: 90 AEROSOL, METERED RESPIRATORY (INHALATION) at 12:40

## 2020-05-01 RX ADMIN — ALBUTEROL SULFATE 2 PUFF: 90 AEROSOL, METERED RESPIRATORY (INHALATION) at 16:05

## 2020-05-01 ASSESSMENT — ENCOUNTER SYMPTOMS
COUGH: 1
PALPITATIONS: 0
WEIGHT LOSS: 0
DOUBLE VISION: 0
SPEECH CHANGE: 0
DIARRHEA: 0
NAUSEA: 0
ORTHOPNEA: 0
BLURRED VISION: 0
EYE PAIN: 0
CHILLS: 0
DIZZINESS: 0
HEADACHES: 0
TREMORS: 0
PHOTOPHOBIA: 0
FOCAL WEAKNESS: 0
HALLUCINATIONS: 0
CONSTIPATION: 0
SHORTNESS OF BREATH: 1
NECK PAIN: 0
ABDOMINAL PAIN: 0
SPUTUM PRODUCTION: 0
TINGLING: 0
FEVER: 0
BACK PAIN: 0
VOMITING: 0
SENSORY CHANGE: 0
MYALGIAS: 0

## 2020-05-01 ASSESSMENT — LIFESTYLE VARIABLES: SUBSTANCE_ABUSE: 0

## 2020-05-01 ASSESSMENT — PAIN SCALES - WONG BAKER: WONGBAKER_NUMERICALRESPONSE: DOESN'T HURT AT ALL

## 2020-05-01 ASSESSMENT — PATIENT HEALTH QUESTIONNAIRE - PHQ9
1. LITTLE INTEREST OR PLEASURE IN DOING THINGS: NOT AT ALL
SUM OF ALL RESPONSES TO PHQ9 QUESTIONS 1 AND 2: 0

## 2020-05-01 NOTE — CARE PLAN
Problem: Pain Management  Goal: Pain level will decrease to patient's comfort goal  Outcome: PROGRESSING AS EXPECTED    Pt denies pain at this time. Pt to inform RN of pain and need for prn Tylenol. Will continue to monitor.

## 2020-05-01 NOTE — PROGRESS NOTES
[facility] Medicine Daily Progress Note    Date of Service  5/1/2020    Chief Complaint  69 y.o. male admitted 4/25/2020 with flu like symptoms and SOB.     Interval Problem Update    I evaluated and examined him at the bedside.  He continued to have shortness of breath and he is using oxygen mask with 6 L of oxygen.  Requested physical therapy to evaluate him for discharge planning.  I am concerned about his breathing that he is requiring 6 L of oxygen and maintaining saturation around 90%.  I encouraged him to use incentive spirometry and I also encouraged him for mobilization.  Requested RN to perform home oxygen evaluation and I discussed with case management.  I discussed plan of care with him.       Consultants/Specialty  None    Code Status  Full    Disposition  To be decided    Review of Systems  Review of Systems   Constitutional: Positive for malaise/fatigue. Negative for chills, fever and weight loss.   HENT: Negative for hearing loss and tinnitus.    Eyes: Negative for blurred vision, double vision, photophobia and pain.   Respiratory: Positive for cough and shortness of breath. Negative for sputum production.    Cardiovascular: Negative for chest pain, palpitations, orthopnea and leg swelling.   Gastrointestinal: Negative for abdominal pain, constipation, diarrhea, nausea and vomiting.   Genitourinary: Negative for dysuria, frequency and urgency.   Musculoskeletal: Negative for back pain, joint pain, myalgias and neck pain.   Skin: Negative for rash.   Neurological: Negative for dizziness, tingling, tremors, sensory change, speech change, focal weakness and headaches.   Psychiatric/Behavioral: Negative for hallucinations and substance abuse.   All other systems reviewed and are negative.       Physical Exam  Temp:  [37 °C (98.6 °F)-37.6 °C (99.6 °F)] 37.2 °C (99 °F)  Pulse:  [66-85] 72  Resp:  [16-22] 16  BP: (102-155)/(62-81) 142/72  SpO2:  [89 %-94 %] 90 %    Physical Exam  Vitals signs reviewed.    Constitutional:       General: He is not in acute distress.  HENT:      Head: Normocephalic and atraumatic. No contusion.      Right Ear: External ear normal.      Left Ear: External ear normal.      Nose: Nose normal.      Mouth/Throat:      Mouth: Mucous membranes are dry.      Pharynx: No oropharyngeal exudate.      Comments: Oxygen mask present  Eyes:      General:         Right eye: No discharge.         Left eye: No discharge.      Pupils: Pupils are equal, round, and reactive to light.   Neck:      Musculoskeletal: No neck rigidity or muscular tenderness.   Cardiovascular:      Rate and Rhythm: Normal rate and regular rhythm.      Heart sounds: No murmur. No friction rub. No gallop.    Pulmonary:      Effort: Pulmonary effort is normal.      Breath sounds: No wheezing or rhonchi.      Comments: Decreased breath sounds bilateral  Abdominal:      General: Bowel sounds are normal. There is no distension.      Palpations: Abdomen is soft.      Tenderness: There is no abdominal tenderness. There is no rebound.   Musculoskeletal: Normal range of motion.         General: No swelling or tenderness.   Skin:     General: Skin is warm and dry.      Coloration: Skin is not jaundiced.   Neurological:      General: No focal deficit present.      Mental Status: He is alert.      Cranial Nerves: No cranial nerve deficit.      Sensory: No sensory deficit.   Psychiatric:         Mood and Affect: Mood normal.     I performed physical exam on May 1, 2020 and it remains similar without any acute changes.  Fluids    Intake/Output Summary (Last 24 hours) at 5/1/2020 1624  Last data filed at 5/1/2020 1242  Gross per 24 hour   Intake 360 ml   Output 700 ml   Net -340 ml       Laboratory  Recent Labs     04/28/20  1900 04/30/20  0423 05/01/20  0557   WBC 5.6 5.0 6.0   RBC 3.15* 2.95* 2.95*   HEMOGLOBIN 12.1* 11.4* 11.5*   HEMATOCRIT 34.5* 32.2* 32.8*   .5* 109.2* 111.2*   MCH 38.4* 38.6* 39.0*   MCHC 35.1 35.4* 35.1   RDW  69.9* 67.8* 69.0*   PLATELETCT 52* 57* 60*   MPV 10.3 11.2 10.6     Recent Labs     04/30/20  0423 05/01/20  0557   SODIUM 135 136   POTASSIUM 3.7 3.7   CHLORIDE 105 106   CO2 23 21   GLUCOSE 96 85   BUN 13 12   CREATININE 0.62 0.62   CALCIUM 7.4* 7.4*                   Imaging  EC-ECHOCARDIOGRAM COMPLETE W/O CONT   Final Result      CT-CTA CHEST PULMONARY ARTERY W/ RECONS   Final Result      No central or large segmental pulmonary embolus is identified. Examination is limited by patient motion.      Small right pleural effusion with overlying atelectasis/consolidation.      Small left pleural effusion with mild overlying atelectasis.      Ill-defined groundglass opacities in the upper lobes, left greater than right, and lingula are noted. Imaging features can be seen with COVID-19 pneumonia, though are nonspecific and can occur with a variety of infectious and noninfectious processes.      Prominence of the main pulmonary artery can be seen with pulmonary arterial hypertension.      Atherosclerotic plaque including coronary artery calcification.      Small pericardial effusion.      Emphysematous changes.      Findings of cirrhosis and portal hypertension.      Ascites.      Left renal cysts.      Cholelithiasis.      6.5 mm right middle lobe pulmonary nodule.      Low Risk: CT at 6-12 months, then consider CT at 18-24 months      High Risk: CT at 6-12 months, then CT at 18-24 months      Low Risk - Minimal or absent history of smoking and of other known risk factors.      High Risk - History of smoking or of other known risk factors.      Note: These recommendations do not apply to lung cancer screening, patients with immunosuppression, or patients with known primary cancer.      Fleischner Society 2017 Guidelines for Management of Incidentally Detected Pulmonary Nodules in Adults      DX-CHEST-PORTABLE (1 VIEW)   Final Result      1.  Central vascular and mild interstitial prominence which could represent  vascular congestion. Cannot exclude interstitial pneumonitis.   2.  Possible retrocardiac left lower lobe opacity which may represent atelectasis and/or pneumonitis.   3.  CT chest may be performed for further evaluation.           Assessment/Plan  * Acute and chronic respiratory failure with hypoxia (HCC)- (present on admission)  Assessment & Plan  Secondary to COPD exacerbation, CAP? COVID negative. Chest x-ray shows possible pneumonitis versus pneumonia.  Oxygen requirement still elevated.  Incentive spirometry.  RT protocol.  Due to his ongoing hypoxia and shortness of breath and history of cancer I ordered CTA pulmonary to evaluate for pulmonary embolism and did not show pulmonary embolism.  Echocardiogram did not show any acute abnormalities   I requested consult with pulmonology and discussed his current condition with Dr. Spangler discussed finding of CT scan as well as with echocardiogram.  I updated patient's son over phone.      Liver cancer (HCC)  Assessment & Plan  Discussed with oncology on-call over the weekend. Patient can resume home lenvatinib on discharge, once acute issues have resolved.   Palliative care consult requested.  Patient reported that he follows oncology at [facility] in [place]    Pulmonary hypertension (HCC)  Assessment & Plan  Continue outpatient sildenafil.    COPD (chronic obstructive pulmonary disease) (HCC)- (present on admission)  Assessment & Plan  Denies wheezing or recent exacerbations.   Continue to monitor.    Thrombocytopenia (HCC)- (present on admission)  Assessment & Plan  History of liver cancer, unknown baseline. Stable ~50. No evidence of active bleeding  Continue to monitor.  Hold anticoagulation.    Elevated troponin  Assessment & Plan  Now resolved.  No chest pain  Continue to monitor.    Transaminitis- (present on admission)  Assessment & Plan  Mildly elevated on admission and continuing to improve. In the setting of liver cancer.  He denies any symptoms of  abdominal pain.  Continue outpatient follow-up with oncology    CAP (community acquired pneumonia)- (present on admission)  Assessment & Plan  Ruled out. Procal negative. Pneumonitis? Treatment as noted above.  I discussed finding of CT scan with patient.  Pulmonology consult appreciate recommendations.    SIRS (systemic inflammatory response syndrome) (HCC)- (present on admission)  Assessment & Plan  SIRS criteria identified on admission:  Tachypnea, with respirations greater than 20 per minute. Secondary to dehydration on admission. Procal low and denies any symptoms.   He remains afebrile.  Continue to monitor.    He continued to have hypoxia and he is requiring 6 L of oxygen.  He does not use oxygen during daytime at his baseline.  He is still requiring oxygen mask and during he talks he develops tachypnea.  I requested physical therapy to evaluate him for discharge planning.  I discussed with case management regarding discharge planning.     I have seen and examined patient on 5/1/2020. I have reviewed vitals, new labs and imaging. I have discussed POC with RN. There are no changes from (4/30/2020) except for what is mentioned above.         VTE prophylaxis: SCDs

## 2020-05-01 NOTE — DISCHARGE PLANNING
Called and LM for April CM at [facility] and LM for call back regarding possible need for Oxygen. [facility] # [phone]. Awaiting call back.

## 2020-05-01 NOTE — PROGRESS NOTES
Received pt in bed. No acute distress noted. Received On 6LO2 humidified via oxy mask- when attempted r/a trial while in bed pt desatted down to low 80's. Reports using 3L O2 during activity @ home and no dose during sleep. Titrated down to 4LO2 @ this time- satting 91%. Moist cough noted- reports white grey sputum- not withnessed. Reports cough since January. Reports last BM x 6 days ago but refuses Bowel regimen despite education. Abd soft/ slightly distended/ denies n/v. Denies pain/discomfort. Denies need for PRN rescue inhaler. All needs met @ this time. Hourly and PRN rounding in practice.

## 2020-05-01 NOTE — DISCHARGE PLANNING
Spoke with patient via phone. Patient escoto [employer] compressor for night oxygen. Has no portable tanks.

## 2020-05-01 NOTE — DISCHARGE PLANNING
Call from April with [facility] who states she has been trying to get in touch with the DME dept at [facility] to see if they have provided him with O2 before as pt states he has oxygen through 'mail order'. She was unable to get through today and will hand off to weekend staff. She was informed that pt is still on 6L O2 and does not have a discharge order for today.    [facility] is contracted with [facility].

## 2020-05-01 NOTE — DISCHARGE PLANNING
Received call back from April CM with [facility]. [facility] is contracted with [facility] if Oxygen needed.

## 2020-05-02 VITALS
DIASTOLIC BLOOD PRESSURE: 70 MMHG | HEIGHT: 72 IN | TEMPERATURE: 98.5 F | OXYGEN SATURATION: 93 % | RESPIRATION RATE: 17 BRPM | HEART RATE: 67 BPM | SYSTOLIC BLOOD PRESSURE: 119 MMHG | BODY MASS INDEX: 22.31 KG/M2 | WEIGHT: 164.68 LBS

## 2020-05-02 PROBLEM — R65.10 SIRS (SYSTEMIC INFLAMMATORY RESPONSE SYNDROME) (HCC): Status: RESOLVED | Noted: 2020-04-25 | Resolved: 2020-05-02

## 2020-05-02 PROBLEM — J18.9 CAP (COMMUNITY ACQUIRED PNEUMONIA): Status: RESOLVED | Noted: 2020-04-25 | Resolved: 2020-05-02

## 2020-05-02 LAB
ANION GAP SERPL CALC-SCNC: 6 MMOL/L (ref 7–16)
BUN SERPL-MCNC: 11 MG/DL (ref 8–22)
CALCIUM SERPL-MCNC: 6.8 MG/DL (ref 8.5–10.5)
CHLORIDE SERPL-SCNC: 107 MMOL/L (ref 96–112)
CO2 SERPL-SCNC: 20 MMOL/L (ref 20–33)
CREAT SERPL-MCNC: 0.56 MG/DL (ref 0.5–1.4)
ERYTHROCYTE [DISTWIDTH] IN BLOOD BY AUTOMATED COUNT: 67.9 FL (ref 35.9–50)
GLUCOSE SERPL-MCNC: 84 MG/DL (ref 65–99)
HCT VFR BLD AUTO: 32.9 % (ref 42–52)
HGB BLD-MCNC: 11.6 G/DL (ref 14–18)
MCH RBC QN AUTO: 38.4 PG (ref 27–33)
MCHC RBC AUTO-ENTMCNC: 35.3 G/DL (ref 33.7–35.3)
MCV RBC AUTO: 108.9 FL (ref 81.4–97.8)
PLATELET # BLD AUTO: 56 K/UL (ref 164–446)
PMV BLD AUTO: 11.3 FL (ref 9–12.9)
POTASSIUM SERPL-SCNC: 3.6 MMOL/L (ref 3.6–5.5)
RBC # BLD AUTO: 3.02 M/UL (ref 4.7–6.1)
SODIUM SERPL-SCNC: 133 MMOL/L (ref 135–145)
WBC # BLD AUTO: 5.6 K/UL (ref 4.8–10.8)

## 2020-05-02 PROCEDURE — 700102 HCHG RX REV CODE 250 W/ 637 OVERRIDE(OP): Performed by: HOSPITALIST

## 2020-05-02 PROCEDURE — 80048 BASIC METABOLIC PNL TOTAL CA: CPT

## 2020-05-02 PROCEDURE — A9270 NON-COVERED ITEM OR SERVICE: HCPCS | Performed by: HOSPITALIST

## 2020-05-02 PROCEDURE — 700102 HCHG RX REV CODE 250 W/ 637 OVERRIDE(OP): Performed by: INTERNAL MEDICINE

## 2020-05-02 PROCEDURE — A9270 NON-COVERED ITEM OR SERVICE: HCPCS | Performed by: INTERNAL MEDICINE

## 2020-05-02 PROCEDURE — 99239 HOSP IP/OBS DSCHRG MGMT >30: CPT | Performed by: INTERNAL MEDICINE

## 2020-05-02 PROCEDURE — 85027 COMPLETE CBC AUTOMATED: CPT

## 2020-05-02 RX ADMIN — ALBUTEROL SULFATE 2 PUFF: 90 AEROSOL, METERED RESPIRATORY (INHALATION) at 09:11

## 2020-05-02 RX ADMIN — SILDENAFIL CITRATE 60 MG: 20 TABLET ORAL at 12:07

## 2020-05-02 RX ADMIN — SILDENAFIL CITRATE 60 MG: 20 TABLET ORAL at 05:24

## 2020-05-02 RX ADMIN — ALBUTEROL SULFATE 2 PUFF: 90 AEROSOL, METERED RESPIRATORY (INHALATION) at 12:07

## 2020-05-02 RX ADMIN — GUAIFENESIN AND DEXTROMETHORPHAN 10 ML: 100; 10 SYRUP ORAL at 05:24

## 2020-05-02 RX ADMIN — ASPIRIN 81 MG: 81 TABLET, COATED ORAL at 05:24

## 2020-05-02 ASSESSMENT — PAIN SCALES - WONG BAKER
WONGBAKER_NUMERICALRESPONSE: DOESN'T HURT AT ALL
WONGBAKER_NUMERICALRESPONSE: DOESN'T HURT AT ALL

## 2020-05-02 NOTE — PROGRESS NOTES
Discuss discharged instruction, for 2 weeks quarantine . Did talk to family member regarding discharged instruction.

## 2020-05-02 NOTE — DISCHARGE PLANNING
note:  Received a call from RN who asked CM to initiate obtaining Home O2 for pt.     Talked to Christin at [facility] at # [phone]. She requested clinical information and home O2 order to be faxed to #[phone].    CM talked to pt who informed CM that he does not need new O2 set up as he already has an [employer] O2 concentrator which he can use 24/7. He said that his son will bring it in when he picks him up . Pt is currently on 3 liters per NC as per flowsheet and order.  CM notified BS RN and Christin at [facility].

## 2020-05-02 NOTE — DISCHARGE INSTRUCTIONS
Discharge Instructions    Discharged to home by car with relative. Discharged via walking, hospital escort: Yes.  Special equipment needed: Oxygen    Be sure to schedule a follow-up appointment with your primary care doctor or any specialists as instructed.     Discharge Plan:   Diet Plan: Discussed  Activity Level: Discussed  Confirmed Follow up Appointment: Patient to Call and Schedule Appointment  Confirmed Symptoms Management: Discussed  Medication Reconciliation Updated: Yes  Influenza Vaccine Indication: Not indicated: Previously immunized this influenza season and > 8 years of age    I understand that a diet low in cholesterol, fat, and sodium is recommended for good health. Unless I have been given specific instructions below for another diet, I accept this instruction as my diet prescription.   Other diet: Regular Diet    Special Instructions: None    · Is patient discharged on Warfarin / Coumadin?   No     Depression / Suicide Risk    As you are discharged from this [facility] facility, it is important to learn how to keep safe from harming yourself.    Recognize the warning signs:  · Abrupt changes in personality, positive or negative- including increase in energy   · Giving away possessions  · Change in eating patterns- significant weight changes-  positive or negative  · Change in sleeping patterns- unable to sleep or sleeping all the time   · Unwillingness or inability to communicate  · Depression  · Unusual sadness, discouragement and loneliness  · Talk of wanting to die  · Neglect of personal appearance   · Rebelliousness- reckless behavior  · Withdrawal from people/activities they love  · Confusion- inability to concentrate     If you or a loved one observes any of these behaviors or has concerns about self-harm, here's what you can do:  · Talk about it- your feelings and reasons for harming yourself  · Remove any means that you might use to hurt yourself (examples: pills, rope, extension cords,  firearm)  · Get professional help from the community (Mental Health, Substance Abuse, psychological counseling)  · Do not be alone:Call your Safe Contact- someone whom you trust who will be there for you.  · Call your local CRISIS HOTLINE 275-2702 or 563-483-6858  · Call your local Children's Mobile Crisis Response Team [place] (364) 789-2931 or www.[employer]  · Call the toll free National Suicide Prevention Hotlines   · National Suicide Prevention Lifeline 814-880-ZODU (4663)  · [place] [employer] Line Network 800-SUICIDE (739-8694)        DISCHARGE INSTRUCTIONS PER Porfirio Young M.D.    Diagnosis: Acute respiratory failure with hypoxia.  Please follow-up with primary care provider, pulmonology and your oncologist.  Please take oxygen as prescribed.  For any medical emergency please go to the nearest emergency room or call [phone].

## 2020-05-02 NOTE — THERAPY
Contact Note     Was paged to see patient prior to d/c.  Notified upon arrival to T2 that patient had already been discharged home.     Alexandr Finnegan PT

## 2020-05-02 NOTE — FACE TO FACE
"Face to Face Note  -  Durable Medical Equipment    Porfirio Young M.D. - NPI: [id number]  I certify that this patient is under my care and that they had a durable medical equipment(DME)face to face encounter by myself that meets the physician DME face-to-face encounter requirements with this patient on:    Date of encounter:   Patient:                    MRN:                       YOB: 2018  Virgilio Loza  [id number]  1950     The encounter with the patient was in whole, or in part, for the following medical condition, which is the primary reason for durable medical equipment:  COPD    I certify that, based on my findings, the following durable medical equipment is medically necessary:  Oxygen.    HOME O2 Saturation Measurements:(Values must be present for Home Oxygen orders)  Room air sat at rest: 83  Room air sat with amb: 80  With liters of O2: 3, O2 sat at rest with O2: 93  With Liters of O2: 3, O2 sat with amb with O2 : 92  Is the patient mobile?: Yes    My Clinical findings support the need for the above equipment due to:  Hypoxia    Supporting Symptoms: The patient requires supplemental oxygen, as the following interventions have been tried with limited or no improvement: \"Incentive spirometry    "

## 2020-05-02 NOTE — PROGRESS NOTES
"Palliative Care   Received Advance Directive - reviewed and scanned into [employer].     [facility] POA: María Elena Garnett [phone] & [phone]  1st alternate  POA: Claude Loza [phone]    Statement of desires for patient:  \"I wish to know all relevant facts of my condition. I can cope better with what I know than with the unknown.\"  \"I want my agent strictly to follow this document.\"  \"If I am dying, it is important form me to be at home.\"  \"My life is only worth living if I can: communicate in some way with my lived ones, be free from pain, live without being hooked up to machines.\"  If I am so sick that I may die soon: I have already decided that I do not want to have the following treatments, even if it means that I might die by not having them: I want no breathing machine, no dialysis, no blood transfusions, no artificial feeding/hydration, no medicines of any kind unless they are provided for my comfort.\"   \"Mu-ism or spirituality is important to me.\"  \"I do not want an autopsy.\"    Elissa Limon A.P.R.N.  Palliative Care Nurse Practitioner  [phone]      "

## 2020-05-03 NOTE — DISCHARGE SUMMARY
Discharge Summary    CHIEF COMPLAINT ON ADMISSION  Chief Complaint   Patient presents with   • Shortness of Breath     fever, cough, body aches, nausea, vomiting. low saturation at check in 75%       Reason for Admission  SOB, fever, cough     Admission Date  4/25/2020    CODE STATUS  Prior    HPI & HOSPITAL COURSE    This is a 69 y.o. male with past medical history of COPD on nocturnal oxygen and history of liver cancer presented to the hospital on April 25, 2020 with complaint of shortness of breath.  He underwent COVID-19 testing x2 and it came back negative.  Due to concern for pulmonary embolism he underwent CTA pulmonary and it did not show pulmonary embolism.  I requested consult with pulmonology and pulmonologist evaluated this patient and recommended that he needs to increase his mobility and titrate his oxygen.  He also underwent echocardiogram and did not show any acute abnormalities.  During his hospitalization he has been receiving sildenafil for his pulmonary hypertension.  His oxygen requirement was on 6 L and it has been trending down and today on the day of discharge his oxygen requirement trended down to 3 L.  I requested RN to perform home oxygen and I ordered home oxygen for him.  Today on the day of discharge I evaluated and examined him at the bedside he denies any acute complaints and he feels ready to be discharged.  I strongly recommend him to follow-up with primary care provider, pulmonologist and oncologist and he expressed understanding.       Therefore, he is discharged in fair and stable condition to home with close outpatient follow-up.    The patient met 2-midnight criteria for an inpatient stay at the time of discharge.    Discharge Date  5/2/2020    FOLLOW UP ITEMS POST DISCHARGE  Pulmonologist  Oncology  Primary care provider    DISCHARGE DIAGNOSES  Principal Problem:    Acute and chronic respiratory failure with hypoxia (HCC) POA: Yes  Active Problems:    Transaminitis POA: Yes     Elevated troponin POA: Unknown    Thrombocytopenia (HCC) POA: Yes    COPD (chronic obstructive pulmonary disease) (HCC) POA: Yes    Pulmonary hypertension (HCC) POA: Unknown    Liver cancer (HCC) POA: Unknown  Resolved Problems:    CAP (community acquired pneumonia) POA: Yes    Lactic acid increased POA: Yes    SIRS (systemic inflammatory response syndrome) (HCC) POA: Yes      FOLLOW UP    [place]    Schedule an appointment as soon as possible for a visit  Please follow up with your primary care provider in [place].       MEDICATIONS ON DISCHARGE     Medication List      CONTINUE taking these medications      Instructions   aspirin EC 81 MG Tbec  Commonly known as:  ECOTRIN   Take 81 mg by mouth every day.  Dose:  81 mg     LENVIMA (8 MG DAILY DOSE) PO   Take 1 Tab by mouth.  Dose:  1 Tab     Opsumit 10 MG Tabs  Generic drug:  Macitentan   Take 1 Tab by mouth every day.  Dose:  1 Tab     sildenafil citrate 25 MG Tabs  Commonly known as:  VIAGRA   Take 60 mg by mouth 3 times a day.  Dose:  60 mg     sodium chloride 0.65 % Soln  Commonly known as:  OCEAN   Spray 2 Sprays in nose 2 times a day as needed for Congestion.  Dose:  2 Spray     terazosin 2 MG Caps  Commonly known as:  HYTRIN   Take 2 mg by mouth every day.  Dose:  2 mg     traZODone 50 MG Tabs  Commonly known as:  DESYREL   Take 50 mg by mouth every evening.  Dose:  50 mg     vitamin E 1000 UNIT Caps  Commonly known as:  VITAMIN E   Take 1 Cap by mouth every day.  Dose:  1 Cap        ASK your doctor about these medications      Instructions   oxyCODONE immediate-release 5 MG Tabs  Commonly known as:  ROXICODONE  Ask about: Should I take this medication?   Take 0.5 Tabs by mouth every 8 hours as needed for Severe Pain for up to 5 days.  Dose:  2.5 mg            Allergies  No Known Allergies    DIET  No orders of the defined types were placed in this encounter.      ACTIVITY  As tolerated.  Weight bearing as  tolerated    CONSULTATIONS  Pulmonology    PROCEDURES  None    LABORATORY  Lab Results   Component Value Date    SODIUM 133 (L) 05/02/2020    POTASSIUM 3.6 05/02/2020    CHLORIDE 107 05/02/2020    CO2 20 05/02/2020    GLUCOSE 84 05/02/2020    BUN 11 05/02/2020    CREATININE 0.56 05/02/2020        Lab Results   Component Value Date    WBC 5.6 05/02/2020    HEMOGLOBIN 11.6 (L) 05/02/2020    HEMATOCRIT 32.9 (L) 05/02/2020    PLATELETCT 56 (L) 05/02/2020      EC-ECHOCARDIOGRAM COMPLETE W/O CONT   Final Result      CT-CTA CHEST PULMONARY ARTERY W/ RECONS   Final Result      No central or large segmental pulmonary embolus is identified. Examination is limited by patient motion.      Small right pleural effusion with overlying atelectasis/consolidation.      Small left pleural effusion with mild overlying atelectasis.      Ill-defined groundglass opacities in the upper lobes, left greater than right, and lingula are noted. Imaging features can be seen with COVID-19 pneumonia, though are nonspecific and can occur with a variety of infectious and noninfectious processes.      Prominence of the main pulmonary artery can be seen with pulmonary arterial hypertension.      Atherosclerotic plaque including coronary artery calcification.      Small pericardial effusion.      Emphysematous changes.      Findings of cirrhosis and portal hypertension.      Ascites.      Left renal cysts.      Cholelithiasis.      6.5 mm right middle lobe pulmonary nodule.      Low Risk: CT at 6-12 months, then consider CT at 18-24 months      High Risk: CT at 6-12 months, then CT at 18-24 months      Low Risk - Minimal or absent history of smoking and of other known risk factors.      High Risk - History of smoking or of other known risk factors.      Note: These recommendations do not apply to lung cancer screening, patients with immunosuppression, or patients with known primary cancer.      Fleischner Society 2017 Guidelines for Management of  Incidentally Detected Pulmonary Nodules in Adults      DX-CHEST-PORTABLE (1 VIEW)   Final Result      1.  Central vascular and mild interstitial prominence which could represent vascular congestion. Cannot exclude interstitial pneumonitis.   2.  Possible retrocardiac left lower lobe opacity which may represent atelectasis and/or pneumonitis.   3.  CT chest may be performed for further evaluation.            Total time of the discharge process exceeds 35 minutes.